# Patient Record
Sex: FEMALE | Race: WHITE | NOT HISPANIC OR LATINO | Employment: PART TIME | URBAN - METROPOLITAN AREA
[De-identification: names, ages, dates, MRNs, and addresses within clinical notes are randomized per-mention and may not be internally consistent; named-entity substitution may affect disease eponyms.]

---

## 2022-02-08 ENCOUNTER — HOSPITAL ENCOUNTER (EMERGENCY)
Facility: HOSPITAL | Age: 32
Discharge: HOME | End: 2022-02-08
Attending: EMERGENCY MEDICINE | Admitting: EMERGENCY MEDICINE
Payer: COMMERCIAL

## 2022-02-08 VITALS
HEIGHT: 63 IN | DIASTOLIC BLOOD PRESSURE: 85 MMHG | BODY MASS INDEX: 26.58 KG/M2 | HEART RATE: 82 BPM | WEIGHT: 150 LBS | SYSTOLIC BLOOD PRESSURE: 120 MMHG | OXYGEN SATURATION: 100 % | RESPIRATION RATE: 16 BRPM | TEMPERATURE: 98.2 F

## 2022-02-08 DIAGNOSIS — H60.502 ACUTE OTITIS EXTERNA OF LEFT EAR, UNSPECIFIED TYPE: ICD-10-CM

## 2022-02-08 PROCEDURE — 99283 EMERGENCY DEPT VISIT LOW MDM: CPT

## 2022-02-08 PROCEDURE — 250N000013 HC RX MED GY IP 250 OP 250 PS 637: Performed by: EMERGENCY MEDICINE

## 2022-02-08 PROCEDURE — 250N000011 HC RX IP 250 OP 636: Performed by: EMERGENCY MEDICINE

## 2022-02-08 RX ORDER — HYDROCODONE BITARTRATE AND ACETAMINOPHEN 5; 325 MG/1; MG/1
1 TABLET ORAL ONCE
Status: COMPLETED | OUTPATIENT
Start: 2022-02-08 | End: 2022-02-08

## 2022-02-08 RX ORDER — HYDROCODONE BITARTRATE AND ACETAMINOPHEN 5; 325 MG/1; MG/1
1-2 TABLET ORAL EVERY 4 HOURS PRN
Qty: 18 TABLET | Refills: 0 | Status: SHIPPED | OUTPATIENT
Start: 2022-02-08 | End: 2022-02-11

## 2022-02-08 RX ORDER — ONDANSETRON 4 MG/1
4 TABLET, ORALLY DISINTEGRATING ORAL ONCE
Status: COMPLETED | OUTPATIENT
Start: 2022-02-08 | End: 2022-02-08

## 2022-02-08 RX ADMIN — HYDROCODONE BITARTRATE AND ACETAMINOPHEN 1 TABLET: 5; 325 TABLET ORAL at 01:10

## 2022-02-08 RX ADMIN — AMOXICILLIN AND CLAVULANATE POTASSIUM 1 TABLET: 875; 125 TABLET, FILM COATED ORAL at 01:10

## 2022-02-08 RX ADMIN — ONDANSETRON 4 MG: 4 TABLET, ORALLY DISINTEGRATING ORAL at 01:10

## 2022-02-08 ASSESSMENT — MIFFLIN-ST. JEOR: SCORE: 1354.53

## 2022-02-08 NOTE — DISCHARGE INSTRUCTIONS
Continue eardrops as previously prescribed  Ibuprofen 600 mg every 6 hours as needed for pain  Return to the emergency department for worsening problems or concerns

## 2022-02-08 NOTE — ED PROVIDER NOTES
EMERGENCY DEPARTMENT ENCOUnter      NAME: Madeline Graham  AGE: 31 year old female  YOB: 1990  MRN: [id number]  EVALUATION DATE & TIME: No admission date for patient encounter.    PCP: No Ref-Primary, Physician    ED PROVIDER: Ana Thayer MD      Chief Complaint   Patient presents with     bilateral ear pain         FINAL IMPRESSION:  1. Acute otitis externa of left ear, unspecified type          ED COURSE & MEDICAL DECISION MAKING:      In summary, the patient is a 31-year-old female that presents to the emergency department for evaluation of left ear pain thought secondary to an otitis externa.  The patient has been using  eardrops without improvement.  We will place on oral antibiotics and treat symptomatically as an outpatient with close outpatient follow-up.    12:56 AM I met with the patient, obtained history, performed an initial exam, and discussed options and plan for diagnostics and treatment here in the ED. Augmentin 1 tablet p.o. was administered for initiation of antibiotic therapy.  Vicodin 1 tablet p.o. was administered for pain.  Zofran ODT 4 mg p.o. was administered for nausea.  1:01 AM Discussed plans for discharge.    At the conclusion of the encounter I discussed the results of all of the tests and the disposition. The questions were answered. The patient or family acknowledged understanding and was agreeable with the care plan.         MEDICATIONS GIVEN IN THE EMERGENCY:  Medications   amoxicillin-clavulanate (AUGMENTIN) 875-125 MG per tablet 1 tablet (1 tablet Oral Given 2/8/22 0110)   HYDROcodone-acetaminophen (NORCO) 5-325 MG per tablet 1 tablet (1 tablet Oral Given 2/8/22 0110)   ondansetron (ZOFRAN-ODT) ODT tab 4 mg (4 mg Oral Given 2/8/22 0110)       NEW PRESCRIPTIONS STARTED AT TODAY'S ER VISIT  Discharge Medication List as of 2/8/2022  1:16 AM      START taking these medications    Details   amoxicillin-clavulanate (AUGMENTIN) 875-125 MG tablet Take 1 tablet  by mouth 2 times daily for 7 days, Disp-14 tablet, R-0, Local Print      HYDROcodone-acetaminophen (NORCO) 5-325 MG tablet Take 1-2 tablets by mouth every 4 hours as needed for pain, Disp-18 tablet, R-0, Local Print                =================================================================    HPI        Madeline Graham is a 31 year old female with no recorded pertinent medical history who presents to this ED by walk in for evaluation of ear pain. Patient reports developing bilateral ear pain 1 week ago with associated slight ear drainage. Patient states was prescribed ear drops via a virtual visit 2 days ago, but states her ear pain continued to get progressively worse since then. She rates her current pain at 9.5/10 and states her left ear hurts more than her right. Patient took ibuprofen for pain control with minimal relief 1-2 hours prior to the patient encounter.    No additional pertinent medical history. No allergies to medications or daily prescription medication use.     Denies fever, chills, or any other symptoms at this time.    SHx- Endorses tobacco, alcohol use (occasional). 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      REVIEW OF SYSTEMS     Constitutional:  Denies fever or chills  HENT:  Positive for ear pain (bilateral; left greater than right), ear drainage (slight). Denies sore throat   Respiratory:  Denies cough or shortness of breath   Cardiovascular:  Denies chest pain or palpitations  GI:  Denies abdominal pain, nausea, or vomiting  Musculoskeletal:  Denies any new extremity pain   Skin:  Denies rash   Neurologic:  Denies headache, focal weakness or sensory changes    All other systems reviewed and are negative      PAST MEDICAL HISTORY:  No past medical history on file.    PAST SURGICAL HISTORY:  Past Surgical History:   Procedure Laterality Date     GYN SURGERY                 CURRENT MEDICATIONS:    amoxicillin-clavulanate (AUGMENTIN) 875-125 MG tablet  HYDROcodone-acetaminophen  "(NORCO) 5-325 MG tablet  ibuprofen (ADVIL,MOTRIN) 800 MG tablet  oxycodone-acetaminophen (PERCOCET) 5-325 MG per tablet  TRAMADOL HCL        ALLERGIES:  No Known Allergies      SOCIAL HISTORY:   Social History     Socioeconomic History     Marital status: Single     Spouse name: Not on file     Number of children: Not on file     Years of education: Not on file     Highest education level: Not on file   Occupational History     Not on file   Tobacco Use     Smoking status: Current Every Day Smoker     Packs/day: 1.00     Years: 5.00     Pack years: 5.00     Types: Cigarettes     Smokeless tobacco: Not on file   Substance and Sexual Activity     Alcohol use: Yes     Comment: ocassionally      Drug use: No     Sexual activity: Not Currently   Other Topics Concern     Not on file   Social History Narrative     Not on file     Social Determinants of Health     Financial Resource Strain: Not on file   Food Insecurity: Not on file   Transportation Needs: Not on file   Physical Activity: Not on file   Stress: Not on file   Social Connections: Not on file   Intimate Partner Violence: Not on file   Housing Stability: Not on file       VITALS:  Patient Vitals for the past 24 hrs:   BP Temp Temp src Pulse Resp SpO2 Height Weight   02/08/22 0021 120/85 98.2  F (36.8  C) Oral 82 16 100 % 1.6 m (5' 3\") 68 kg (150 lb)       PHYSICAL EXAM    Constitutional:  Well developed, Well nourished,  HENT:  Normocephalic, Atraumatic, left external auditory canal is edematous and fluid-filled, left ear auricle is mildly erythematous without displacement of auricle,  oropharynx moist, Nose normal.   Neck:  Normal range of motion, No meningismus, No stridor.   Eyes:  EOMI, Conjunctiva normal, No discharge.   Respiratory:  Normal breath sounds, No respiratory distress, No wheezing, No chest tenderness.   Cardiovascular:  Normal heart rate, Normal rhythm, No murmurs  Musculoskeletal:  Neurovascularly intact distally, No edema, No tenderness, No " cyanosis, Good range of motion in all major joints. No tenderness to palpation or major deformities noted.   Integument:  Warm, Dry, left ear auricle is mildly erythematous without edema  Lymphatic:  No lymphadenopathy noted.   No focal deficits noted.   Psychiatric:  Affect normal, Judgment normal, Mood normal.             I, Samir Morrow, am serving as a scribe to document services personally performed by Dr. Thayer based on my observation and the provider's statements to me. I, Ana Thayer MD attest that Samir Morrow is acting in a scribe capacity, has observed my performance of the services and has documented them in accordance with my direction.    Ana Thayer MD  Emergency Medicine  [facility] EMERGENCY DEPARTMENT  [address] [place] [id number]  [phone]  Dept: [phone]     Ana Thayer MD  02/08/22 0408

## 2022-02-08 NOTE — ED NOTES
Madeline has tolerated the medications well; she has been able to eat a couple of soda crackers and has been sipping on osmin mist. No signs of allergy/intolerance.

## 2022-02-08 NOTE — ED TRIAGE NOTES
"Bilateral ear pain for 1 week. Slight drainage. She tried \"antibiotic drops and things got worse\". Pain a 8.  "

## 2022-05-10 ENCOUNTER — APPOINTMENT (OUTPATIENT)
Dept: CT IMAGING | Facility: CLINIC | Age: 32
End: 2022-05-10
Attending: EMERGENCY MEDICINE
Payer: COMMERCIAL

## 2022-05-10 ENCOUNTER — HOSPITAL ENCOUNTER (INPATIENT)
Facility: CLINIC | Age: 32
LOS: 1 days | Discharge: HOME | End: 2022-05-12
Attending: EMERGENCY MEDICINE | Admitting: HOSPITALIST
Payer: COMMERCIAL

## 2022-05-10 DIAGNOSIS — H92.01 RIGHT EAR PAIN: ICD-10-CM

## 2022-05-10 DIAGNOSIS — H60.502 ACUTE OTITIS EXTERNA OF LEFT EAR, UNSPECIFIED TYPE: Primary | ICD-10-CM

## 2022-05-10 DIAGNOSIS — K11.21 PAROTITIS, ACUTE: ICD-10-CM

## 2022-05-10 LAB
ANION GAP SERPL CALCULATED.3IONS-SCNC: 10 MMOL/L (ref 5–18)
BUN SERPL-MCNC: 15 MG/DL (ref 8–22)
CALCIUM SERPL-MCNC: 9.1 MG/DL (ref 8.5–10.5)
CHLORIDE BLD-SCNC: 105 MMOL/L (ref 98–107)
CO2 SERPL-SCNC: 24 MMOL/L (ref 22–31)
CREAT SERPL-MCNC: 0.72 MG/DL (ref 0.6–1.1)
ERYTHROCYTE [DISTWIDTH] IN BLOOD BY AUTOMATED COUNT: 11.8 % (ref 10–15)
GFR SERPL CREATININE-BSD FRML MDRD: >90 ML/MIN/1.73M2
GLUCOSE BLD-MCNC: 46 MG/DL (ref 70–125)
GLUCOSE BLDC GLUCOMTR-MCNC: 101 MG/DL (ref 70–99)
GLUCOSE BLDC GLUCOMTR-MCNC: 104 MG/DL (ref 70–99)
HCG UR QL: NEGATIVE
HCT VFR BLD AUTO: 41.2 % (ref 35–47)
HGB BLD-MCNC: 13.7 G/DL (ref 11.7–15.7)
HOLD SPECIMEN: NORMAL
MCH RBC QN AUTO: 31.9 PG (ref 26.5–33)
MCHC RBC AUTO-ENTMCNC: 33.3 G/DL (ref 31.5–36.5)
MCV RBC AUTO: 96 FL (ref 78–100)
PLATELET # BLD AUTO: 300 10E3/UL (ref 150–450)
POTASSIUM BLD-SCNC: 4 MMOL/L (ref 3.5–5)
RBC # BLD AUTO: 4.3 10E6/UL (ref 3.8–5.2)
SODIUM SERPL-SCNC: 139 MMOL/L (ref 136–145)
WBC # BLD AUTO: 8.9 10E3/UL (ref 4–11)

## 2022-05-10 PROCEDURE — 99285 EMERGENCY DEPT VISIT HI MDM: CPT | Mod: 25

## 2022-05-10 PROCEDURE — 85027 COMPLETE CBC AUTOMATED: CPT | Performed by: EMERGENCY MEDICINE

## 2022-05-10 PROCEDURE — 36415 COLL VENOUS BLD VENIPUNCTURE: CPT | Performed by: EMERGENCY MEDICINE

## 2022-05-10 PROCEDURE — 70487 CT MAXILLOFACIAL W/DYE: CPT

## 2022-05-10 PROCEDURE — 96375 TX/PRO/DX INJ NEW DRUG ADDON: CPT

## 2022-05-10 PROCEDURE — 80048 BASIC METABOLIC PNL TOTAL CA: CPT | Performed by: EMERGENCY MEDICINE

## 2022-05-10 PROCEDURE — 81025 URINE PREGNANCY TEST: CPT | Performed by: EMERGENCY MEDICINE

## 2022-05-10 PROCEDURE — 96365 THER/PROPH/DIAG IV INF INIT: CPT

## 2022-05-10 PROCEDURE — 258N000003 HC RX IP 258 OP 636: Performed by: EMERGENCY MEDICINE

## 2022-05-10 PROCEDURE — 250N000011 HC RX IP 250 OP 636: Performed by: EMERGENCY MEDICINE

## 2022-05-10 PROCEDURE — 96366 THER/PROPH/DIAG IV INF ADDON: CPT

## 2022-05-10 PROCEDURE — C9803 HOPD COVID-19 SPEC COLLECT: HCPCS

## 2022-05-10 PROCEDURE — 96367 TX/PROPH/DG ADDL SEQ IV INF: CPT

## 2022-05-10 PROCEDURE — 96361 HYDRATE IV INFUSION ADD-ON: CPT

## 2022-05-10 RX ORDER — IOPAMIDOL 755 MG/ML
100 INJECTION, SOLUTION INTRAVASCULAR ONCE
Status: COMPLETED | OUTPATIENT
Start: 2022-05-11 | End: 2022-05-10

## 2022-05-10 RX ORDER — DEXTROSE MONOHYDRATE 25 G/50ML
50 INJECTION, SOLUTION INTRAVENOUS ONCE
Status: DISCONTINUED | OUTPATIENT
Start: 2022-05-10 | End: 2022-05-10

## 2022-05-10 RX ORDER — ONDANSETRON 2 MG/ML
4 INJECTION INTRAMUSCULAR; INTRAVENOUS ONCE
Status: COMPLETED | OUTPATIENT
Start: 2022-05-10 | End: 2022-05-10

## 2022-05-10 RX ORDER — MORPHINE SULFATE 4 MG/ML
4 INJECTION, SOLUTION INTRAMUSCULAR; INTRAVENOUS ONCE
Status: COMPLETED | OUTPATIENT
Start: 2022-05-10 | End: 2022-05-10

## 2022-05-10 RX ADMIN — ONDANSETRON 4 MG: 2 INJECTION INTRAMUSCULAR; INTRAVENOUS at 21:05

## 2022-05-10 RX ADMIN — IOPAMIDOL 100 ML: 755 INJECTION, SOLUTION INTRAVENOUS at 23:52

## 2022-05-10 RX ADMIN — MORPHINE SULFATE 4 MG: 4 INJECTION, SOLUTION INTRAMUSCULAR; INTRAVENOUS at 21:06

## 2022-05-10 RX ADMIN — SODIUM CHLORIDE 500 ML: 9 INJECTION, SOLUTION INTRAVENOUS at 21:06

## 2022-05-10 RX ADMIN — CEFEPIME HYDROCHLORIDE 2 G: 2 INJECTION, POWDER, FOR SOLUTION INTRAVENOUS at 21:16

## 2022-05-10 ASSESSMENT — ENCOUNTER SYMPTOMS
FACIAL SWELLING: 1
VOMITING: 0
ABDOMINAL PAIN: 0
COUGH: 0
DIARRHEA: 0
TROUBLE SWALLOWING: 0
HEADACHES: 0
SHORTNESS OF BREATH: 0
NAUSEA: 0

## 2022-05-11 PROBLEM — H92.01 RIGHT EAR PAIN: Status: ACTIVE | Noted: 2022-05-11

## 2022-05-11 LAB
MRSA DNA SPEC QL NAA+PROBE: POSITIVE
SA TARGET DNA: POSITIVE
SARS-COV-2 RNA RESP QL NAA+PROBE: NEGATIVE

## 2022-05-11 PROCEDURE — 250N000013 HC RX MED GY IP 250 OP 250 PS 637: Performed by: HOSPITALIST

## 2022-05-11 PROCEDURE — 87077 CULTURE AEROBIC IDENTIFY: CPT | Performed by: HOSPITALIST

## 2022-05-11 PROCEDURE — 87641 MR-STAPH DNA AMP PROBE: CPT | Performed by: HOSPITALIST

## 2022-05-11 PROCEDURE — U0005 INFEC AGEN DETEC AMPLI PROBE: HCPCS | Performed by: EMERGENCY MEDICINE

## 2022-05-11 PROCEDURE — 99207 PR NO BILLABLE SERVICE THIS VISIT: CPT | Performed by: INTERNAL MEDICINE

## 2022-05-11 PROCEDURE — 258N000003 HC RX IP 258 OP 636: Performed by: STUDENT IN AN ORGANIZED HEALTH CARE EDUCATION/TRAINING PROGRAM

## 2022-05-11 PROCEDURE — 250N000013 HC RX MED GY IP 250 OP 250 PS 637: Performed by: INTERNAL MEDICINE

## 2022-05-11 PROCEDURE — 250N000011 HC RX IP 250 OP 636: Performed by: STUDENT IN AN ORGANIZED HEALTH CARE EDUCATION/TRAINING PROGRAM

## 2022-05-11 PROCEDURE — 250N000011 HC RX IP 250 OP 636: Performed by: HOSPITALIST

## 2022-05-11 PROCEDURE — 99223 1ST HOSP IP/OBS HIGH 75: CPT | Performed by: HOSPITALIST

## 2022-05-11 PROCEDURE — 258N000003 HC RX IP 258 OP 636: Performed by: HOSPITALIST

## 2022-05-11 PROCEDURE — 120N000001 HC R&B MED SURG/OB

## 2022-05-11 RX ORDER — NALOXONE HYDROCHLORIDE 0.4 MG/ML
0.2 INJECTION, SOLUTION INTRAMUSCULAR; INTRAVENOUS; SUBCUTANEOUS
Status: DISCONTINUED | OUTPATIENT
Start: 2022-05-11 | End: 2022-05-12 | Stop reason: HOSPADM

## 2022-05-11 RX ORDER — HYDROMORPHONE HYDROCHLORIDE 1 MG/ML
0.3 INJECTION, SOLUTION INTRAMUSCULAR; INTRAVENOUS; SUBCUTANEOUS
Status: DISCONTINUED | OUTPATIENT
Start: 2022-05-11 | End: 2022-05-12 | Stop reason: HOSPADM

## 2022-05-11 RX ORDER — NICOTINE 21 MG/24HR
1 PATCH, TRANSDERMAL 24 HOURS TRANSDERMAL DAILY
Status: DISCONTINUED | OUTPATIENT
Start: 2022-05-11 | End: 2022-05-12 | Stop reason: HOSPADM

## 2022-05-11 RX ORDER — NALOXONE HYDROCHLORIDE 0.4 MG/ML
0.4 INJECTION, SOLUTION INTRAMUSCULAR; INTRAVENOUS; SUBCUTANEOUS
Status: DISCONTINUED | OUTPATIENT
Start: 2022-05-11 | End: 2022-05-12 | Stop reason: HOSPADM

## 2022-05-11 RX ORDER — TRAMADOL HYDROCHLORIDE 50 MG/1
50-100 TABLET ORAL EVERY 6 HOURS PRN
Status: DISCONTINUED | OUTPATIENT
Start: 2022-05-11 | End: 2022-05-12 | Stop reason: HOSPADM

## 2022-05-11 RX ORDER — IBUPROFEN 600 MG/1
600 TABLET, FILM COATED ORAL EVERY 6 HOURS PRN
Status: DISCONTINUED | OUTPATIENT
Start: 2022-05-11 | End: 2022-05-12 | Stop reason: HOSPADM

## 2022-05-11 RX ORDER — CIPROFLOXACIN AND DEXAMETHASONE 3; 1 MG/ML; MG/ML
4 SUSPENSION/ DROPS AURICULAR (OTIC) 2 TIMES DAILY
Status: DISCONTINUED | OUTPATIENT
Start: 2022-05-11 | End: 2022-05-12 | Stop reason: HOSPADM

## 2022-05-11 RX ORDER — KETOROLAC TROMETHAMINE 15 MG/ML
15 INJECTION, SOLUTION INTRAMUSCULAR; INTRAVENOUS EVERY 6 HOURS PRN
Status: DISCONTINUED | OUTPATIENT
Start: 2022-05-11 | End: 2022-05-11

## 2022-05-11 RX ORDER — ACETAMINOPHEN 325 MG/1
975 TABLET ORAL EVERY 8 HOURS
Status: DISCONTINUED | OUTPATIENT
Start: 2022-05-11 | End: 2022-05-12 | Stop reason: HOSPADM

## 2022-05-11 RX ORDER — LIDOCAINE 40 MG/G
CREAM TOPICAL
Status: DISCONTINUED | OUTPATIENT
Start: 2022-05-11 | End: 2022-05-12 | Stop reason: HOSPADM

## 2022-05-11 RX ORDER — TRAMADOL HYDROCHLORIDE 50 MG/1
50 TABLET ORAL EVERY 6 HOURS PRN
Status: DISCONTINUED | OUTPATIENT
Start: 2022-05-11 | End: 2022-05-11

## 2022-05-11 RX ADMIN — TRAMADOL HYDROCHLORIDE 50 MG: 50 TABLET, COATED ORAL at 23:58

## 2022-05-11 RX ADMIN — ACETAMINOPHEN 975 MG: 325 TABLET ORAL at 08:18

## 2022-05-11 RX ADMIN — NICOTINE POLACRILEX 4 MG: 4 GUM, CHEWING ORAL at 03:31

## 2022-05-11 RX ADMIN — CEFEPIME HYDROCHLORIDE 2 G: 2 INJECTION, POWDER, FOR SOLUTION INTRAVENOUS at 08:34

## 2022-05-11 RX ADMIN — CEFEPIME HYDROCHLORIDE 2 G: 2 INJECTION, POWDER, FOR SOLUTION INTRAVENOUS at 21:06

## 2022-05-11 RX ADMIN — NICOTINE 1 PATCH: 21 PATCH, EXTENDED RELEASE TRANSDERMAL at 03:43

## 2022-05-11 RX ADMIN — ACETAMINOPHEN 975 MG: 325 TABLET ORAL at 14:54

## 2022-05-11 RX ADMIN — CIPROFLOXACIN AND DEXAMETHASONE 4 DROP: 3; 1 SUSPENSION/ DROPS AURICULAR (OTIC) at 17:13

## 2022-05-11 RX ADMIN — VANCOMYCIN HYDROCHLORIDE 1000 MG: 5 INJECTION, POWDER, LYOPHILIZED, FOR SOLUTION INTRAVENOUS at 16:43

## 2022-05-11 RX ADMIN — VANCOMYCIN HYDROCHLORIDE 1250 MG: 5 INJECTION, POWDER, LYOPHILIZED, FOR SOLUTION INTRAVENOUS at 03:36

## 2022-05-11 RX ADMIN — ACETAMINOPHEN 975 MG: 325 TABLET ORAL at 21:06

## 2022-05-11 RX ADMIN — CIPROFLOXACIN AND DEXAMETHASONE 4 DROP: 3; 1 SUSPENSION/ DROPS AURICULAR (OTIC) at 21:06

## 2022-05-11 ASSESSMENT — ACTIVITIES OF DAILY LIVING (ADL)
DRESSING/BATHING_DIFFICULTY: NO
TOILETING_ISSUES: NO
ADLS_ACUITY_SCORE: 35
ADLS_ACUITY_SCORE: 18
ADLS_ACUITY_SCORE: 35
ADLS_ACUITY_SCORE: 35
DOING_ERRANDS_INDEPENDENTLY_DIFFICULTY: NO
ADLS_ACUITY_SCORE: 35
ADLS_ACUITY_SCORE: 35
FALL_HISTORY_WITHIN_LAST_SIX_MONTHS: NO
WEAR_GLASSES_OR_BLIND: NO
ADLS_ACUITY_SCORE: 18
CHANGE_IN_FUNCTIONAL_STATUS_SINCE_ONSET_OF_CURRENT_ILLNESS/INJURY: NO
ADLS_ACUITY_SCORE: 35
DIFFICULTY_EATING/SWALLOWING: NO
WALKING_OR_CLIMBING_STAIRS_DIFFICULTY: NO
CONCENTRATING,_REMEMBERING_OR_MAKING_DECISIONS_DIFFICULTY: NO
ADLS_ACUITY_SCORE: 18
ADLS_ACUITY_SCORE: 35
ADLS_ACUITY_SCORE: 18
ADLS_ACUITY_SCORE: 35

## 2022-05-11 NOTE — PHARMACY-ADMISSION MEDICATION HISTORY
Pharmacy Note - Admission Medication History    Pertinent Provider Information: None. ______________________________________________________________________    Prior To Admission (PTA) med list completed and updated in EMR.       No outpatient medications have been marked as taking for the 5/10/22 encounter (Hospital Encounter).     Information source(s): Patient, Clinic records and [facility]  Method of interview communication: in-person    Summary of Changes to PTA Med List  Patient reports she is not currently taking any prescription or OTC/herbal products specifically. PTA med list reflects this.    The information provided in this note is only as accurate as the sources available at the time of the update(s).    Thank you for the opportunity to participate in the care of this patient.    Lauren Mallory, PharmD, BCPS  5/10/2022 9:14 PM

## 2022-05-11 NOTE — PROGRESS NOTES
"[facility]    Hospitalist Progress Note    Date of Service (when I saw the patient): 05/11/2022    Assessment & Plan   Madeline Graham is a 32 year old female who was admitted on 5/10/2022.    Madeline Graham is a 32 year old female who  has no past medical history on file. tobacco abuse  Chief Complaint: right ear pain     Assessment and Plan  Right parotitis  Right otitis externa  Facial cellulitis  Continue cefepime, vancomycin started in the emergency room  Add Cipro-HC topical   MRSA nasal swab returned positive   Prn toradol  Schedule tylenol  ID consult requested      Hypoglycemia  No Hx DM, no prior episodes, no hypoglycemic medications  Asymptomatic blood glucose of 46 noted on BMP  Fingersticks showed fairly normal blood glucose  Possible laboratory error  Continue to monitor     Tobacco abuse  1ppd  Will order patch     DVTP: held, ambulate  Code Status: No Order Full  Disposition: Inpatient   Estimated body mass index is 26.57 kg/m  as calculated from the following:    Height as of this encounter: 1.6 m (5' 3\").    Weight as of this encounter: 68 kg (150 lb).     DVT Prophylaxis: Ambulate every shift  Code Status: Full Code    Disposition: Expected discharge in the next 2-3days after pain improved and antibiotic plan in place     Dian Ayers MD, MD  [phone] (P)      Interval History   Seen in Ed. Pain controlled. No acute issues since admission     -Data reviewed today: I reviewed all new labs and imaging results over the last 24 hours. I personally reviewed all the labs and imaging since admission     Physical Exam   Temp: 98.2  F (36.8  C) Temp src: Oral BP: 104/57 Pulse: 90   Resp: 16 SpO2: 100 % O2 Device: None (Room air)    Vitals:    05/10/22 1925   Weight: 68 kg (150 lb)     Vital Signs with Ranges  Temp:  [98  F (36.7  C)-98.2  F (36.8  C)] 98.2  F (36.8  C)  Pulse:  [73-94] 90  Resp:  [16-18] 16  BP: ()/(56-83) 104/57  SpO2:  [97 %-100 %] 100 %  I/O " last 3 completed shifts:  In: 250 [IV Piggyback:250]  Out: -     Constitutional: Awake, alert, cooperative, no apparent distress  Respiratory: Clear to auscultation bilaterally, no crackles or wheezing  Cardiovascular: Regular rate and rhythm, normal S1 and S2, and no murmur noted  GI: Normal bowel sounds, soft, non-distended, non-tender  Skin/Integumen: No rashes, no cyanosis, no edema. RIght ear with erythema and tender to palpation   Other:     Medications       acetaminophen  975 mg Oral Q8H     ceFEPIme (MAXIPIME) IV  2 g Intravenous Q12H     ciprofloxacin-hydrocortisone  3 drop Right Ear BID     nicotine  1 patch Transdermal Daily     nicotine   Transdermal Q8H     sodium chloride (PF)  3 mL Intracatheter Q8H     vancomycin  1,000 mg Intravenous Q12H       Data   Recent Labs   Lab 05/10/22  2220 05/10/22  2053 05/10/22  2026   WBC  --   --  8.9   HGB  --   --  13.7   MCV  --   --  96   PLT  --   --  300   NA  --   --  139   POTASSIUM  --   --  4.0   CHLORIDE  --   --  105   CO2  --   --  24   BUN  --   --  15   CR  --   --  0.72   ANIONGAP  --   --  10   YEN  --   --  9.1   * 101* 46*       Recent Results (from the past 24 hour(s))   CT Facial Bones with Contrast    Narrative    EXAM: CT FACIAL BONES WITH CONTRAST  LOCATION: [facility]  DATE/TIME: 5/10/2022 11:44 PM    INDICATION: Right ear swelling and infection.  COMPARISON: None.  CONTRAST: Isovue 370 75ml  TECHNIQUE: Routine CT Maxillofacial with IV contrast. Multiplanar reformats. Dose reduction techniques were used.     FINDINGS:  OSSEOUS STRUCTURES/SOFT TISSUES: There is asymmetric soft tissue swelling and enhancement involving the right ear with some involvement of the origin of the right proximal external auditory canal. Additionally, there is asymmetric enhancement and slight   asymmetric enlargement of the right parotid gland. No drainable fluid collection or abscess identified. No facial bone fracture or  malalignment. No evidence for dental trauma or periapical abscess.    ORBITAL CONTENTS: No acute abnormality.    SINUSES: Left frontal sinus mucous retention cyst. Subtotal opacification of the left ethmoid air cells. The remaining paranasal sinuses are predominantly clear. The bilateral mastoid air cells and middle ear cavities are clear.    VISUALIZED INTRACRANIAL CONTENTS: No acute abnormality.       Impression    IMPRESSION:   1.  Asymmetric soft tissue swelling and enhancement involving the right ear with some extension into the right proximal external auditory canal. These findings are concerning for right-sided otitis externa with associated soft tissue cellulitis of the   right ear.  2.  Asymmetric enhancement and slight enlargement of the right parotid gland, likely reflecting acute right-sided parotitis.  3.  No drainable fluid collection or abscess.  4.  The mastoid air cells are clear. No evidence of mastoiditis.

## 2022-05-11 NOTE — ED TRIAGE NOTES
"Pt arrives with complaints of right ear pain and swelling. Pt states, \"I have been having issues with it for the last few months, have been on antibiotics twice. Now it's swelling on the outside of my ear for the last day and a half.\" Last on antibiotics a month ago. Denies any hearing loss, did have blood on a Q-tip recently.      Triage Assessment     Row Name 05/10/22 1924       Triage Assessment (Adult)    Airway WDL WDL       Respiratory WDL    Respiratory WDL WDL       Skin Circulation/Temperature WDL    Skin Circulation/Temperature WDL WDL       Cardiac WDL    Cardiac WDL WDL       Peripheral/Neurovascular WDL    Peripheral Neurovascular WDL WDL       Cognitive/Neuro/Behavioral WDL    Cognitive/Neuro/Behavioral WDL WDL              "

## 2022-05-11 NOTE — UTILIZATION REVIEW
Admission Status; Secondary Review Determination   Under the authority of the Utilization Management Committee, the utilization review process indicated a secondary review on Madeline Graham. The review outcome is based on review of the medical records, discussions with staff, and applying clinical experience noted on the date of the review.   (x) Inpatient Status Appropriate - This patient's medical care is consistent with medical management for inpatient care and reasonable inpatient medical practice.     RATIONALE FOR DETERMINATION   32 year old female with past medical history of tobacco abuse who presented to ER right ear pain and admitted with Right parotitis, Right otitis externa and Facial cellulitis, failed out patient oral antibiotics , still on IV antibiotics with vanco and cefepime     At the time of admission with the information available to the attending physician more than 2 nights Hospital complex care was anticipated, based on patient risk of adverse outcome if treated as outpatient and complex care required. Inpatient admission is appropriate based on the Medicare guidelines.   The information on this document is developed by the utilization review team in order for the business office to ensure compliance. This only denotes the appropriateness of proper admission status and does not reflect the quality of care rendered.   The definitions of Inpatient Status and Observation Status used in making the determination above are those provided in the CMS Coverage Manual, Chapter 1 and Chapter 6, section 70.4.   Sincerely,   Alex Moffett MD  Utilization Review  Physician Advisor  [facility]

## 2022-05-11 NOTE — H&P
"Hospital Medicine Service History and Physical  [facility]: [facility]    Madeline Graham is a 32 year old female who  has no past medical history on file. tobacco abuse  Chief Complaint: right ear pain    Assessment and Plan  Right parotitis  Right otitis externa  Facial cellulitis  Continue cefepime, vancomycin started in the emergency room  Add Cipro-HC topical  Check MRSA swab  Prn toradol  Schedule tylenol    Hypoglycemia  No Hx DM, no prior episodes, no hypoglycemic medications  Asymptomatic blood glucose of 46 noted on BMP  Fingersticks showed fairly normal blood glucose  Possible laboratory error  Continue to monitor    Tobacco abuse  1ppd  Will order patch    DVTP: held, ambulate  Code Status: No Order Full  Disposition: Inpatient   Estimated body mass index is 26.57 kg/m  as calculated from the following:    Height as of this encounter: 1.6 m (5' 3\").    Weight as of this encounter: 68 kg (150 lb).    History of Present Illness  Madeline Graham was initially seen 2/8 in the ED for ear pain. She was Dx'ed with otitis externa and discharged on Augmentin. She took her antibiotics as prescribed, and did have an improvement in her Sx, but did not experience complete resolution. Her ear pain returned and so she was seen again at [facility] mid-march where they gave her amoxicillin and external cortisporin  She used these treatments as prescribed. She hasn't had change in hearing. No changes in vision. Denies Hx of prior ear infection. No mouth pain. Has Hx abscessed tooth right mandible. Interestingly she had several molars removed after a pregnancy complication.  ROS + ear pain  ROS - chest pain, sob, N/V/D, fevers/chills, dysuria  All other systems reviewed and are negative  In the ED, patient is afebrile, vital signs stable, stable on room air.  No leukocytosis on labs.  CT facial bones with contrast shows soft tissue swelling/enhancement right ear with some extension into proximal external " auditory canal concerning for otitis externa and soft tissue    Appears fatigued  Anicteric conjunctiva, PERRL  moist mucous membranes, poor dentition all molars surgically absent  No purulence from parotid duct, ttp right external ear and pre-auricular area, no drainage noted, tragus edematous and slightly erythematous, external canal narrowed  Trachea midline, no LAD  cta, Respiratory effort normal on RA  rrr, no murmur  Abdomen soft, nondistended, nontender  no edema, clubbing of nails absent  Skin normal temperature, dry  Fairly normal affect, alert  Vital signs reviewed by me    Wt Readings from Last 4 Encounters:   05/10/22 68 kg (150 lb)   02/08/22 68 kg (150 lb)   02/20/08 62.6 kg (138 lb) (66 %, Z= 0.42)*   01/18/07 59.9 kg (132 lb) (62 %, Z= 0.29)*     * Growth percentiles are based on [facility] (Girls, 2-20 Years) data.        reports that she has been smoking cigarettes. She has a 5.00 pack-year smoking history. She does not have any smokeless tobacco history on file. She reports current alcohol use. She reports that she does not use drugs.  family history is not on file.  Mother - frequent cysts   has a past surgical history that includes GYN surgery.   No Known Allergies    Reinier Butterfield MD, MPH  [facility]   Phone: #[phone]

## 2022-05-11 NOTE — PLAN OF CARE
Pt eating, drinking, voiding and has active bowel sounds.      She states her pain is tolerable and didn't need anything for it.    Problem: Plan of Care - These are the overarching goals to be used throughout the patient stay.    Goal: Plan of Care Review/Shift Note  Description: The Plan of Care Review/Shift note should be completed every shift.  The Outcome Evaluation is a brief statement about your assessment that the patient is improving, declining, or no change.  This information will be displayed automatically on your shift note.  Outcome: Ongoing, Progressing     Problem: Infection  Goal: Absence of Infection Signs and Symptoms  Outcome: Ongoing, Progressing     Continue to monitor VS, labs, pain level, ear infection and activity tolerance.       Goal Outcome Evaluation:

## 2022-05-11 NOTE — PHARMACY-VANCOMYCIN DOSING SERVICE
Pharmacy Vancomycin Initial Note  Date of Service May 11, 2022  Patient's  1990  32 year old, female    Indication: Skin and Soft Tissue Infection    Current estimated CrCl = Estimated Creatinine Clearance: 101.8 mL/min (based on SCr of 0.72 mg/dL).    Creatinine for last 3 days  5/10/2022:  8:26 PM Creatinine 0.72 mg/dL    Recent Vancomycin Level(s) for last 3 days  No results found for requested labs within last 72 hours.      Vancomycin IV Administrations (past 72 hours)                   vancomycin 1250 mg in 0.9% NaCl 250 mL intermittent infusion 1,250 mg (mg) 1,250 mg New Bag 22 0336                Nephrotoxins and other renal medications (From now, onward)    Start     Dose/Rate Route Frequency Ordered Stop    22 0330  vancomycin 1250 mg in 0.9% NaCl 250 mL intermittent infusion 1,250 mg         1,250 mg  over 90 Minutes Intravenous ONCE 22 0314            Contrast Orders - past 72 hours (72h ago, onward)    Start     Dose/Rate Route Frequency Stop    22 0000  iopamidol (ISOVUE-370) solution 100 mL         100 mL Intravenous ONCE 05/10/22 2352        Loading dose: 1250mg x 1 0330 22  Regimen: 1000 mg IV every 12 hours.  Start time: 15:36 on 2022  Exposure target: AUC24 (range)400-600 mg/L.hr   AUC24,ss: 446 mg/L.hr  Probability of AUC24 > 400: 61 %  Ctrough,ss: 13.0 mg/L  Probability of Ctrough,ss > 20: 19 %  Probability of nephrotoxicity (Lodise CATHLEEN ): 8 %            Plan:  1. Start vancomycin 1250mg x 1 followed by 1000mg q12h.  2. Vancomycin monitoring method: AUC  3. Vancomycin therapeutic monitoring goal: 400-600 mg*h/L  4. Pharmacy will check vancomycin levels as appropriate in 1-3 Days.    5. Serum creatinine levels will be ordered daily for the first week of therapy and at least twice weekly for subsequent weeks.      Yomi Kaiser [facility]

## 2022-05-11 NOTE — ED PROVIDER NOTES
In the    EMERGENCY DEPARTMENT ENCOUNTER      NAME: Madeline Graham  AGE: 32 year old female  YOB: 1990  MRN: [id number]  EVALUATION DATE & TIME: 5/10/2022  8:15 PM    PCP: No Ref-Primary, Physician    ED PROVIDER: Mackenzie Hodges M.D.        Chief Complaint   Patient presents with     Otalgia     Facial Swelling         FINAL IMPRESSION:    1. Right ear pain            MEDICAL DECISION MAKIN year old female who reports frequent ear infections over the past couple of months.  She has been taking antibiotics but as soon as the antibiotics complete the infection seems to come back.  She reports that she is currently on an amoxicillin antibiotic at this time and despite taking that she is now having increasing ear pain and swelling including pain into the bones anterior and posterior to the ear.  Concerns for mastoiditis.  Laboratories look good.  Since she is having chronic ear infections Pseudomonas was a concern and patient was treated empirically with cefepime awaiting CT scan results.  Ultimately patient will require hospitalization since this infection appears to be getting worse despite oral antibiotics even though mastoiditis is not seen by CT scan.  Patient is aware of this plan.    Patient signed out at change of shift awaiting admission to hospital.  Patient is aware that there may not be a bed available here at [facility] and that we will look to outside hospitals in the [facility] for bed placement          ED COURSE:  8:51 PM  I met with the patient to gather history and perform my exam. ED course and treatment discussed.    9:02 PM  See the call from lab that her sugar was in the 40s on her BMP.  Nursing rechecked and is now 100.  Patient states she had been eating some candy just a few moments ago.  We will recheck the sugar in 30 minutes.  She denies being diabetic or on any medicines for diabetes.    12:34 AM  I updated patient and her  that we are still waiting for CT  scan results.  No matter what the CT scan shows though she will require hospitalization as she is reportedly taking oral biotics without improvement and has been battling this now for several months.  She understands.  Margarita bustamante does plan to hang out though to figure out where she is going to be admitted to in case he needs to transfer her by private car.  This is certainly understandable and reasonable.  They are aware that this may take a few hours now to find an available bed once we have the CT scan result.  Patient has a ready been given antibiotics for concerns for possible mastoiditis to ongoing chronic otitis media.  Pseudomonas was certainly a concern in the situation he does not appear toxic or septic.  No concerns for meningitis.    12:46 AM  Patient signed out at change of shift to Dr. Bender looking for a hospital bed for admission.      COVID-19 PPE worn during patient evaluation:  Mask: n95 and homemade masks   Eye Protection: goggles   Gown: none   Hair cover: yes  Face shield: none   Patient wearing a mask: yes     At the conclusion of the encounter I discussed the results of all of the tests and the disposition. Their questions were answered. The patient (and any family present) acknowledged understanding and were agreeable with the care plan.      CONSULTANTS:  none      MEDICATIONS GIVEN IN THE EMERGENCY:  Medications   0.9% sodium chloride BOLUS (0 mLs Intravenous Stopped 5/10/22 2141)   morphine (PF) injection 4 mg (4 mg Intravenous Given 5/10/22 2106)   ondansetron (ZOFRAN) injection 4 mg (4 mg Intravenous Given 5/10/22 2105)   ceFEPIme (MAXIPIME) 2 g vial to attach to  ml bag for ADULTS or 50 ml bag for PEDS (0 g Intravenous Stopped 5/10/22 2147)   iopamidol (ISOVUE-370) solution 100 mL (100 mLs Intravenous Given 5/10/22 2352)       NEW PRESCRIPTIONS STARTED AT TODAY'S ER VISIT     Medication List      There are no discharge medications for this visit.              CONDITION:  stable        DISPOSITION:  Pending admission         =================================================================  =================================================================    HPI    Patient information was obtained from: patient    Use of Intrepreter: N/A     Madeline Graham is a 32 year old female with history of otitis media who presents to the ER with complaints of right ear pain.    States she has been having an ear infection in this right ear since about January.  Antibiotics will seem to work but then shortly after getting off them the infection will come right back.  Now about 2 days ago she started having swelling to the right ear and now pain the right side of her face.  She is currently on amoxicillin.    Denies any fevers, cough, chest pain, shortness of breath, vomiting, diarrhea.    She has not been seen by ENT for this.      REVIEW OF SYSTEMS  Review of Systems   HENT: Positive for ear pain and facial swelling. Negative for trouble swallowing.    Respiratory: Negative for cough and shortness of breath.    Cardiovascular: Negative for chest pain.   Gastrointestinal: Negative for abdominal pain, diarrhea, nausea and vomiting.   Allergic/Immunologic: Negative for immunocompromised state.   Neurological: Negative for headaches.   All other systems reviewed and are negative.          PAST MEDICAL HISTORY:  No past medical history on file.      PAST SURGICAL HISTORY:  Past Surgical History:   Procedure Laterality Date     GYN SURGERY               CURRENT MEDICATIONS:    Prior to Admission medications    Medication Sig Start Date End Date Taking? Authorizing Provider   ibuprofen (ADVIL,MOTRIN) 800 MG tablet Take 1 tablet by mouth every 8 hours as needed for pain and fever. 11   Adam Jimenez MD   oxycodone-acetaminophen (PERCOCET) 5-325 MG per tablet Take 1 tablet by mouth every 4 hours as needed for pain. 11   Adam Jimenez MD  "  TRAMADOL HCL     Reported, Patient         ALLERGIES:  No Known Allergies      FAMILY HISTORY:  No family history on file.      SOCIAL HISTORY:  Social History     Socioeconomic History     Marital status: Single   Tobacco Use     Smoking status: Current Every Day Smoker     Packs/day: 1.00     Years: 5.00     Pack years: 5.00     Types: Cigarettes   Substance and Sexual Activity     Alcohol use: Yes     Comment: ocassionally      Drug use: No     Sexual activity: Not Currently         VITALS:  Patient Vitals for the past 24 hrs:   BP Temp Temp src Pulse Resp SpO2 Height Weight   05/11/22 0000 106/58 -- -- 85 18 100 % -- --   05/10/22 1925 110/81 98  F (36.7  C) Oral 92 18 100 % 1.6 m (5' 3\") 68 kg (150 lb)       Wt Readings from Last 3 Encounters:   05/10/22 68 kg (150 lb)   02/08/22 68 kg (150 lb)   02/20/08 62.6 kg (138 lb) (66 %, Z= 0.42)*     * Growth percentiles are based on [facility] (Girls, 2-20 Years) data.         PHYSICAL EXAM    Constitutional:  Well developed, Well nourished, NAD, GCS 15  HENT:  Normocephalic, Atraumatic, left external ear normal.  Right external ear with swelling and tenderness both anteriorly and posterior to the ear.  Ear canal quite swollen though no obvious debris in the external canal. Nose normal. Neck- Supple, No stridor.  Eyes:  PERRL, EOMI, Conjunctiva normal, No discharge.  Respiratory:  Normal breath sounds, No respiratory distress, No wheezing, Speaks full sentences easily. No cough.   Cardiovascular:  Normal heart rate, Regular rhythm, No rubs, No gallops.   GI:  No excessive obesity.  Bowel sounds normal, Soft, No tenderness, No masses, No flank tenderness. No rebound or guarding.  : deferred  Musculoskeletal: No cyanosis, No clubbing. Good range of motion in all major joints. No major deformities noted.   Integument:  Warm, Dry, No erythema, No rash.  No petechiae.   Neurologic:  Alert & oriented x 3, No focal deficits noted. Normal gait.   Psychiatric:  Affect normal, " Cooperative         LAB:  All pertinent labs reviewed and interpreted.  Recent Results (from the past 24 hour(s))   CBC (+ platelets, no diff)    Collection Time: 05/10/22  8:26 PM   Result Value Ref Range    WBC Count 8.9 4.0 - 11.0 10e3/uL    RBC Count 4.30 3.80 - 5.20 10e6/uL    Hemoglobin 13.7 11.7 - 15.7 g/dL    Hematocrit 41.2 35.0 - 47.0 %    MCV 96 78 - 100 fL    MCH 31.9 26.5 - 33.0 pg    MCHC 33.3 31.5 - 36.5 g/dL    RDW 11.8 10.0 - 15.0 %    Platelet Count 300 150 - 450 10e3/uL   Basic metabolic panel    Collection Time: 05/10/22  8:26 PM   Result Value Ref Range    Sodium 139 136 - 145 mmol/L    Potassium 4.0 3.5 - 5.0 mmol/L    Chloride 105 98 - 107 mmol/L    Carbon Dioxide (CO2) 24 22 - 31 mmol/L    Anion Gap 10 5 - 18 mmol/L    Urea Nitrogen 15 8 - 22 mg/dL    Creatinine 0.72 0.60 - 1.10 mg/dL    Calcium 9.1 8.5 - 10.5 mg/dL    Glucose 46 (LL) 70 - 125 mg/dL    GFR Estimate >90 >60 mL/min/1.73m2   Extra Red Top Tube    Collection Time: 05/10/22  8:26 PM   Result Value Ref Range    Hold Specimen JIC    Extra Green Top (Lithium Heparin) Tube    Collection Time: 05/10/22  8:26 PM   Result Value Ref Range    Hold Specimen JIC    Extra Purple Top Tube    Collection Time: 05/10/22  8:26 PM   Result Value Ref Range    Hold Specimen JIC    Glucose by meter    Collection Time: 05/10/22  8:53 PM   Result Value Ref Range    GLUCOSE BY METER POCT 101 (H) 70 - 99 mg/dL   Glucose by meter    Collection Time: 05/10/22 10:20 PM   Result Value Ref Range    GLUCOSE BY METER POCT 104 (H) 70 - 99 mg/dL   HCG qualitative urine    Collection Time: 05/10/22 10:39 PM   Result Value Ref Range    hCG Urine Qualitative Negative Negative       No results found for: ABORH        RADIOLOGY:  Reviewed all pertinent imaging. Please see official radiology report.    CT Facial Bones with Contrast   Preliminary Result   IMPRESSION:    1.  Asymmetric soft tissue swelling and enhancement involving the right ear with some extension into  the right external auditory canal. These findings are concerning for right-sided otitis externa with associated soft tissue cellulitis of the right ear.   2.  Asymmetric enhancement and slight enlargement of the right parotid gland, likely reflecting acute right-sided parotitis.   3.  No drainable fluid collection or abscess.   4.  The mastoid air cells are clear.            EKG:    none    PROCEDURES:  none        Mackenzie Hodges M.D. [facility]  Emergency Medicine and Medical Toxicology  [facility] EMERGENCY ROOM  [address] [id number] [phone]  Dept: [phone]           Mackenzie Hodges MD  05/11/22 0046

## 2022-05-11 NOTE — ED NOTES
"Received patient on sign out.    HPI  Madeline Graham is a 32 year old female with history of otitis media who presents to the ER with complaints of right ear pain.     States she has been having an ear infection in this right ear since about January.  Antibiotics will seem to work but then shortly after getting off them the infection will come right back.  Now about 2 days ago she started having swelling to the right ear and now pain the right side of her face.  She is currently on amoxicillin.     Denies any fevers, cough, chest pain, shortness of breath, vomiting, diarrhea.     She has not been seen by ENT for this.           Vitals:    05/10/22 1925 05/11/22 0000   BP: 110/81 106/58   Pulse: 92 85   Resp: 18 18   Temp: 98  F (36.7  C)    TempSrc: Oral    SpO2: 100% 100%   Weight: 68 kg (150 lb)    Height: 1.6 m (5' 3\")               ED COURSE AND MEDICAL DECISION MAKING  12:51 AM the patient was signed out to me by Dr. Hodges        Received patient on signout.  Briefly 32-year-old who has been struggling with recurrent otitis media since January, here now with worsening symptoms.  Severe ear pain, drainage, redness and swelling on her face.  CT scan showing otitis media, otitis externa, as well as parotitis.  No mastoiditis.    On signout, awaiting bed placement.    [facility]  We will keep patient here.  Admitted to the hospitalist service.  Added on vancomycin to cover for MRSA.          FINAL DIAGNOSIS    Auricular cellulitis        Dr. Juvencio Bender  [facility] ER  May 11, 2022     Juvencio Bender DO  05/11/22 0312    "

## 2022-05-11 NOTE — PLAN OF CARE
Problem: Plan of Care - These are the overarching goals to be used throughout the patient stay.    Goal: Plan of Care Review/Shift Note  Description: The Plan of Care Review/Shift note should be completed every shift.  The Outcome Evaluation is a brief statement about your assessment that the patient is improving, declining, or no change.  This information will be displayed automatically on your shift note.  Outcome: Ongoing, Progressing     Problem: Plan of Care - These are the overarching goals to be used throughout the patient stay.    Goal: Optimal Comfort and Wellbeing  Outcome: Ongoing, Progressing   Goal Outcome Evaluation:        Patient transferred to room 376. Assessed and oriented to room and unit routine.   Stated that pain in 6/10. Resting comfortably.

## 2022-05-12 VITALS
HEART RATE: 83 BPM | WEIGHT: 150 LBS | DIASTOLIC BLOOD PRESSURE: 73 MMHG | OXYGEN SATURATION: 100 % | HEIGHT: 63 IN | BODY MASS INDEX: 26.58 KG/M2 | SYSTOLIC BLOOD PRESSURE: 122 MMHG | RESPIRATION RATE: 18 BRPM | TEMPERATURE: 97.4 F

## 2022-05-12 LAB
ANION GAP SERPL CALCULATED.3IONS-SCNC: 8 MMOL/L (ref 5–18)
BUN SERPL-MCNC: 11 MG/DL (ref 8–22)
CALCIUM SERPL-MCNC: 8.6 MG/DL (ref 8.5–10.5)
CHLORIDE BLD-SCNC: 108 MMOL/L (ref 98–107)
CO2 SERPL-SCNC: 22 MMOL/L (ref 22–31)
CREAT SERPL-MCNC: 0.58 MG/DL (ref 0.6–1.1)
ERYTHROCYTE [DISTWIDTH] IN BLOOD BY AUTOMATED COUNT: 11.6 % (ref 10–15)
GFR SERPL CREATININE-BSD FRML MDRD: >90 ML/MIN/1.73M2
GLUCOSE BLD-MCNC: 87 MG/DL (ref 70–125)
HCT VFR BLD AUTO: 41.8 % (ref 35–47)
HGB BLD-MCNC: 13.6 G/DL (ref 11.7–15.7)
MCH RBC QN AUTO: 31.3 PG (ref 26.5–33)
MCHC RBC AUTO-ENTMCNC: 32.5 G/DL (ref 31.5–36.5)
MCV RBC AUTO: 96 FL (ref 78–100)
PLATELET # BLD AUTO: 271 10E3/UL (ref 150–450)
POTASSIUM BLD-SCNC: 4.2 MMOL/L (ref 3.5–5)
RBC # BLD AUTO: 4.34 10E6/UL (ref 3.8–5.2)
SODIUM SERPL-SCNC: 138 MMOL/L (ref 136–145)
WBC # BLD AUTO: 10 10E3/UL (ref 4–11)

## 2022-05-12 PROCEDURE — 82310 ASSAY OF CALCIUM: CPT | Performed by: INTERNAL MEDICINE

## 2022-05-12 PROCEDURE — 99254 IP/OBS CNSLTJ NEW/EST MOD 60: CPT | Performed by: INTERNAL MEDICINE

## 2022-05-12 PROCEDURE — 85014 HEMATOCRIT: CPT | Performed by: INTERNAL MEDICINE

## 2022-05-12 PROCEDURE — 250N000011 HC RX IP 250 OP 636: Performed by: HOSPITALIST

## 2022-05-12 PROCEDURE — 99238 HOSP IP/OBS DSCHRG MGMT 30/<: CPT | Performed by: INTERNAL MEDICINE

## 2022-05-12 PROCEDURE — 36415 COLL VENOUS BLD VENIPUNCTURE: CPT | Performed by: INTERNAL MEDICINE

## 2022-05-12 PROCEDURE — 250N000013 HC RX MED GY IP 250 OP 250 PS 637: Performed by: HOSPITALIST

## 2022-05-12 PROCEDURE — 258N000003 HC RX IP 258 OP 636: Performed by: HOSPITALIST

## 2022-05-12 RX ORDER — ACETAMINOPHEN 325 MG/1
975 TABLET ORAL EVERY 8 HOURS
Qty: 200 TABLET | Refills: 0 | COMMUNITY
Start: 2022-05-12 | End: 2022-05-12

## 2022-05-12 RX ORDER — IBUPROFEN 600 MG/1
600 TABLET, FILM COATED ORAL EVERY 8 HOURS PRN
Qty: 100 TABLET | Refills: 0 | Status: SHIPPED | OUTPATIENT
Start: 2022-05-12

## 2022-05-12 RX ORDER — OFLOXACIN 3 MG/ML
4 SOLUTION AURICULAR (OTIC) 2 TIMES DAILY
Qty: 10 ML | Refills: 0 | Status: ON HOLD | OUTPATIENT
Start: 2022-05-12 | End: 2024-03-20

## 2022-05-12 RX ORDER — OFLOXACIN 3 MG/ML
4 SOLUTION AURICULAR (OTIC) 2 TIMES DAILY
Qty: 10 ML | Refills: 0 | Status: SHIPPED | OUTPATIENT
Start: 2022-05-12 | End: 2022-05-12

## 2022-05-12 RX ORDER — CIPROFLOXACIN 500 MG/1
500 TABLET, FILM COATED ORAL 2 TIMES DAILY
Qty: 14 TABLET | Refills: 0 | Status: SHIPPED | OUTPATIENT
Start: 2022-05-12 | End: 2022-05-12

## 2022-05-12 RX ORDER — DOXYCYCLINE HYCLATE 100 MG
100 TABLET ORAL 2 TIMES DAILY
Qty: 20 TABLET | Refills: 0 | Status: ON HOLD | OUTPATIENT
Start: 2022-05-12 | End: 2024-03-20

## 2022-05-12 RX ORDER — ACETAMINOPHEN 325 MG/1
975 TABLET ORAL EVERY 8 HOURS PRN
Qty: 200 TABLET | Refills: 0 | Status: SHIPPED | OUTPATIENT
Start: 2022-05-12

## 2022-05-12 RX ORDER — DOXYCYCLINE HYCLATE 100 MG
100 TABLET ORAL 2 TIMES DAILY
Qty: 20 TABLET | Refills: 0 | Status: SHIPPED | OUTPATIENT
Start: 2022-05-12 | End: 2022-05-12

## 2022-05-12 RX ORDER — IBUPROFEN 600 MG/1
600 TABLET, FILM COATED ORAL EVERY 8 HOURS PRN
Qty: 100 TABLET | Refills: 0 | Status: SHIPPED | OUTPATIENT
Start: 2022-05-12 | End: 2022-05-12

## 2022-05-12 RX ORDER — CIPROFLOXACIN 500 MG/1
500 TABLET, FILM COATED ORAL 2 TIMES DAILY
Qty: 14 TABLET | Refills: 0 | Status: ON HOLD | OUTPATIENT
Start: 2022-05-12 | End: 2024-03-20

## 2022-05-12 RX ADMIN — CIPROFLOXACIN AND DEXAMETHASONE 4 DROP: 3; 1 SUSPENSION/ DROPS AURICULAR (OTIC) at 08:18

## 2022-05-12 RX ADMIN — ACETAMINOPHEN 975 MG: 325 TABLET ORAL at 05:45

## 2022-05-12 RX ADMIN — CEFEPIME HYDROCHLORIDE 2 G: 2 INJECTION, POWDER, FOR SOLUTION INTRAVENOUS at 08:18

## 2022-05-12 RX ADMIN — VANCOMYCIN HYDROCHLORIDE 1000 MG: 5 INJECTION, POWDER, LYOPHILIZED, FOR SOLUTION INTRAVENOUS at 04:22

## 2022-05-12 RX ADMIN — NICOTINE 1 PATCH: 21 PATCH, EXTENDED RELEASE TRANSDERMAL at 08:18

## 2022-05-12 ASSESSMENT — ACTIVITIES OF DAILY LIVING (ADL)
ADLS_ACUITY_SCORE: 18

## 2022-05-12 NOTE — PROGRESS NOTES
"A&Ox4. R ear inner/outer and right side of face warm, pink and swollen, tender to palpation. Given PRN tramadol x 1 and sched tylenol for pain with good relief. IV abx infused per orders. PIV SL. Up ind, calls appropriately. Hourly rounding completed, continuing to monitor.    Blood pressure 100/62, pulse 88, temperature 97.8  F (36.6  C), temperature source Oral, resp. rate 16, height 1.6 m (5' 3\"), weight 68 kg (150 lb), SpO2 97 %.    Mally Bahena RN  "

## 2022-05-12 NOTE — DISCHARGE SUMMARY
Pt is calling to get an update on the order for the genetic testing.  Pt would also like to clarify whether or not she was supposed to have other labs done besides the thyroid.    Call connected to [facility] OB Triage Queue.  Routed to provider's clinical pool.    Message confirmed with caller     [facility]  Discharge Summary        Madeline Graham MRN# [id number]   YOB: 1990 Age: 32 year old     Date of Admission:  5/10/2022  Date of Discharge:  5/12/2022  Admitting Physician:  No admitting provider for patient encounter.  Discharge Physician: Dian Ayers MD  Discharging Service: Hospitalist     Primary Provider: No Ref-Primary, Physician  Primary Care Physician Phone Number: None         Discharge Diagnoses/Problem Oriented Hospital Course (Providers):    Madeline Graham was admitted on 5/10/2022 by No admitting provider for patient encounter. and I would refer you to their history and physical.  The following problems were addressed during her hospitalization:  Madeline Garham is a 32 year old female who was admitted on 5/10/2022.     Madeline Graham is a 32 year old female who  has no past medical history on file. tobacco abuse  Chief Complaint: right ear pain     Assessment and Plan  Right parotitis  Right otitis externa  Facial cellulitis  Continue cefepime, vancomycin started in the emergency room  Add Cipro-HC topical   MRSA nasal swab returned positive   Prn toradol  Schedule tylenol, oral tramadol given for pain   ID consult requested     Patient will be discharged home on oral doxycycline to cover for possible MRSA and oral Cipro twice daily for 7 days  Ofloxacin otic eardrops also given to patient    Redness of the year in the parotid gland and tenderness and swelling much improved with treatment since admission  Patient feels much better today and she wants to go home and that she is getting discharged in stable condition  Her pain is well controlled with the Tylenol  Patient is to follow-up with outpatient ENT as soon as possible     Hypoglycemia  No Hx DM, no prior episodes, no hypoglycemic medications  Asymptomatic blood glucose of 46 noted on BMP  Fingersticks showed fairly normal blood glucose  Possible laboratory error  No further episodes of  "hypoglycemia noted     Tobacco abuse  1ppd  Will order patch     DVTP: held, ambulate  Code Status: No Order Full  Disposition: Inpatient   Estimated body mass index is 26.57 kg/m  as calculated from the following:    Height as of this encounter: 1.6 m (5' 3\").    Weight as of this encounter: 68 kg (150 lb).     DVT Prophylaxis: Ambulate every shift  Code Status: Full Code     Disposition:  Home today in stable condition     Dian Ayers MD, MD  [phone] (P)             Code Status:      Full Code        Brief Hospital Stay Summary Sent Home With Patient in AVS:        Reason for your hospital stay      Acute otitis externa and acute right parotitis                 Important Results:      See below         Pending Results:        Unresulted Labs Ordered in the Past 30 Days of this Admission     Date and Time Order Name Status Description    5/11/2022 10:15 AM MRSA Culture Reflex Preliminary             Discharge Instructions and Follow-Up:      Follow-up Appointments     Follow-up and recommended labs and tests       F/u with ENT in 1week               Discharge Disposition:      Discharged to home        Discharge Medications:        Current Discharge Medication List      START taking these medications    Details   acetaminophen (TYLENOL) 325 MG tablet Take 3 tablets (975 mg) by mouth every 8 hours  Qty: 200 tablet, Refills: 0    Associated Diagnoses: Acute otitis externa of left ear, unspecified type      ciprofloxacin (CIPRO) 500 MG tablet Take 1 tablet (500 mg) by mouth 2 times daily for 7 days  Qty: 14 tablet, Refills: 0    Associated Diagnoses: Acute otitis externa of left ear, unspecified type      doxycycline hyclate (VIBRA-TABS) 100 MG tablet Take 1 tablet (100 mg) by mouth 2 times daily for 10 days  Qty: 20 tablet, Refills: 0    Associated Diagnoses: Acute otitis externa of left ear, unspecified type      ibuprofen (ADVIL/MOTRIN) 600 MG tablet Take 1 tablet (600 mg) by mouth every 8 hours as needed " "for moderate pain  Qty: 100 tablet, Refills: 0    Associated Diagnoses: Acute otitis externa of left ear, unspecified type      ofloxacin (FLOXIN) 0.3 % otic solution Place 4 drops into the right ear 2 times daily  Qty: 10 mL, Refills: 0    Associated Diagnoses: Acute otitis externa of left ear, unspecified type               Allergies:       No Known Allergies        Consultations This Hospital Stay:      Consultation during this admission received from infectious disease        Condition and Physical on Discharge:      Discharge condition: Stable   Vitals: Blood pressure 122/73, pulse 83, temperature 97.4  F (36.3  C), temperature source Oral, resp. rate 18, height 1.6 m (5' 3\"), weight 68 kg (150 lb), SpO2 100 %.     Constitutional: Alert, awake, NAD    Lungs: CTA b/l    Cardiovascular: RRR   Abdomen: Soft, NT, ND, BS+   Skin: RIght sided ear and virgilio aricular area swelling and erythema much improved    Other:          Discharge Time:      Less than 30 minutes.        Image Results From This Hospital Stay (For Non-EPIC Providers):        Results for orders placed or performed during the hospital encounter of 05/10/22   CT Facial Bones with Contrast    Narrative    EXAM: CT FACIAL BONES WITH CONTRAST  LOCATION: [facility]  DATE/TIME: 5/10/2022 11:44 PM    INDICATION: Right ear swelling and infection.  COMPARISON: None.  CONTRAST: Isovue 370 75ml  TECHNIQUE: Routine CT Maxillofacial with IV contrast. Multiplanar reformats. Dose reduction techniques were used.     FINDINGS:  OSSEOUS STRUCTURES/SOFT TISSUES: There is asymmetric soft tissue swelling and enhancement involving the right ear with some involvement of the origin of the right proximal external auditory canal. Additionally, there is asymmetric enhancement and slight   asymmetric enlargement of the right parotid gland. No drainable fluid collection or abscess identified. No facial bone fracture or malalignment. No evidence for dental " trauma or periapical abscess.    ORBITAL CONTENTS: No acute abnormality.    SINUSES: Left frontal sinus mucous retention cyst. Subtotal opacification of the left ethmoid air cells. The remaining paranasal sinuses are predominantly clear. The bilateral mastoid air cells and middle ear cavities are clear.    VISUALIZED INTRACRANIAL CONTENTS: No acute abnormality.       Impression    IMPRESSION:   1.  Asymmetric soft tissue swelling and enhancement involving the right ear with some extension into the right proximal external auditory canal. These findings are concerning for right-sided otitis externa with associated soft tissue cellulitis of the   right ear.  2.  Asymmetric enhancement and slight enlargement of the right parotid gland, likely reflecting acute right-sided parotitis.  3.  No drainable fluid collection or abscess.  4.  The mastoid air cells are clear. No evidence of mastoiditis.           Most Recent Lab Results In EPIC (For Non-EPIC Providers):    Most Recent 3 CBC's:  Recent Labs   Lab Test 05/12/22  0709 05/10/22  2026   WBC 10.0 8.9   HGB 13.6 13.7   MCV 96 96    300      Most Recent 3 BMP's:  Recent Labs   Lab Test 05/12/22  0709 05/10/22  2220 05/10/22  2053 05/10/22  2026     --   --  139   POTASSIUM 4.2  --   --  4.0   CHLORIDE 108*  --   --  105   CO2 22  --   --  24   BUN 11  --   --  15   CR 0.58*  --   --  0.72   ANIONGAP 8  --   --  10   YEN 8.6  --   --  9.1   GLC 87 104* 101* 46*     Most Recent 3 Troponin's:No lab results found.    Invalid input(s): TROP, TROPONINIES  Most Recent 3 INR's:No lab results found.  Most Recent 2 LFT's:No lab results found.  Most Recent Cholesterol Panel:No lab results found.  Most Recent 6 Bacteria Isolates From Any Culture (See EPIC Reports for Culture Details):No lab results found.  Most Recent TSH, T4 and HgbA1c: No lab results found.

## 2022-05-12 NOTE — PROGRESS NOTES
Discharged to home. Stated understanding of discharge instructions. Patient requested prescriptions to be sent to a [facility]. She left prior to this writer letting her know  where the prescriptions will be filled.

## 2022-05-12 NOTE — PROGRESS NOTES
To whom it may concern,    Ms.Sara Graham was hospitalized from 5/11/22 to 5/13/22 at [facility]. Please excuse her from work until 5/16/22    Dian Ayers MD   hospitalist   [facility]   Phone [phone]

## 2022-05-12 NOTE — CONSULTS
"[facility]  General ID Service Consult      Patient: Madeline Graham  YOB: 1990, MRN: [id number]  Date of Admission:  5/10/2022  Date of Consult: 05/12/2022  Consult Requested by: Dian Ayers MD  Admission Diagnosis: Right ear pain [H92.01]      ID Assessment & Plan   Complicated otitis externa with cellulitis  outpt Augmentin  MRSA positive    PLAN  Improved on vanco cefepime  Ok wih po doxy and cipro x 10 days total abx  Plus otic cipro  followup ENT PRN    Will sign off, please call with questions        Kimberly Krishnan MD  [facility]  ______________________________________________________________________      History of Present Illness   Per dr Butterfield  Madeline Graham was initially seen 2/8 in the ED for ear pain. She was Dx'ed with otitis externa and discharged on Augmentin. She took her antibiotics as prescribed, and did have an improvement in her Sx, but did not experience complete resolution. Her ear pain returned and so she was seen again at [facility] mid-march where they gave her amoxicillin and external cortisporin  She used these treatments as prescribed. She hasn't had change in hearing. No changes in vision. Denies Hx of prior ear infection. No mouth pain. Has Hx abscessed tooth right mandible. Interestingly she had several molars removed after a pregnancy complication.  ROS + ear pain  ROS - chest pain, sob, N/V/D, fevers/chills, dysuria  All other systems reviewed and are negative  In the ED, patient is afebrile, vital signs stable, stable on room air.  No leukocytosis on labs.  CT facial bones with contrast shows soft tissue swelling/enhancement right ear with some extension into proximal external auditory canal concerning for otitis externa and soft tissue\"  EXAM in ed:  \" Right external ear with swelling and tenderness both anteriorly and posterior to the ear.  Ear canal quite swollen though no obvious debris in the external " "canal. Nose normal. Neck- Supple, No stridor.\"  Pt much improved now, never had ear drainage  Swelling gone  No prior MRSA, but SO has had MRSA  No swimming  No fevers pta    Radiology personally reviewed  Ct                                                                   IMPRESSION:   1.  Asymmetric soft tissue swelling and enhancement involving the right ear with some extension into the right proximal external auditory canal. These findings are concerning for right-sided otitis externa with associated soft tissue cellulitis of the   right ear.  2.  Asymmetric enhancement and slight enlargement of the right parotid gland, likely reflecting acute right-sided parotitis.  3.  No drainable fluid collection or abscess.  4.  The mastoid air cells are clear. No evidence of mastoiditis.    Review of Systems   The 10 point Review of Systems is negative other than noted in the HPI or here.     Past Medical History    No past medical history on file.    Past Surgical History   Past Surgical History:   Procedure Laterality Date     GYN SURGERY             Social History   Social History     Tobacco Use     Smoking status: Current Every Day Smoker     Packs/day: 1.00     Years: 5.00     Pack years: 5.00     Types: Cigarettes   Substance Use Topics     Alcohol use: Yes     Comment: ocassionally      Drug use: No       Family History     Pos diabetes in grandmother    Medications   I have reviewed this patient's current medications    Allergies   No Known Allergies    Physical Exam   Vital Signs: Temp: 97.4  F (36.3  C) Temp src: Oral BP: 122/73 Pulse: 83   Resp: 18 SpO2: 100 % O2 Device: None (Room air)    Weight: 150 lbs 0 oz    Gen. appearance nontoxic  Eyes no conjunctivitis or icterus  Head no swelling no tenderness, except tragus;no redness  Neck no stiffness or neck vein distention, no LN  Heart  No edema  Lungs breathing comfortably    Abdomen soft not tender  Extremities no synovitis, trace edema  Skin  no " rash or emboli  Neurologic alert oriented no focal deficits        Data   Inflammatory Markers No lab results found.     Hematology Studies   Recent Labs   Lab Test 05/12/22  0709 05/10/22  2026   WBC 10.0 8.9   HGB 13.6 13.7   MCV 96 96    300       Metabolic Studies   Recent Labs   Lab Test 05/12/22  0709 05/10/22  2026    139   POTASSIUM 4.2 4.0   CHLORIDE 108* 105   CO2 22 24   BUN 11 15   CR 0.58* 0.72   GFRESTIMATED >90 >90       Hepatic Studies  No lab results found.    Most Recent 6 Bacteria Isolates From Any Culture (See EPIC Reports for Culture Details):No lab results found.    Urine Studies  No lab results found.    Vancomycin Levels  No lab results found.    Invalid input(s): VANCO    Hepatitis B Testing No lab results found.  Hepatitis C Testing   No results found for: HCVAB, HQTG, HCGENO, HCPCR, HQTRNA, HEPRNA  HIVTesting No lab results found.    Respiratory Virus Testing    No results found for: RS, FLUAG  COVID-19 Antibody Results, Testing for Immunity    COVID-19 Antibody Results, Testing for Immunity   No data to display.         COVID-19 PCR Results    COVID-19 PCR Results 5/11/22   SARS CoV2 PCR Negative      Comments are available for some flowsheets but are not being displayed.

## 2022-05-12 NOTE — PROGRESS NOTES
Care Management Initial Consult      Additional Information:  Chart reviewed.  Pt from home independently.  CM following care progression to assist as needed with discharge planning.     ESTER Cruz

## 2022-05-13 ENCOUNTER — PATIENT OUTREACH (OUTPATIENT)
Dept: CARE COORDINATION | Facility: CLINIC | Age: 32
End: 2022-05-13
Payer: COMMERCIAL

## 2022-05-13 DIAGNOSIS — Z71.89 OTHER SPECIFIED COUNSELING: ICD-10-CM

## 2022-05-13 NOTE — PROGRESS NOTES
Clinic Care Coordination Contact  [facility]/Voicemail       Clinical Data: Care Coordinator Outreach  Reason for referral: TCM outreach  Outreach attempted x 1.  Left message on patient's voicemail with call back information and requested return call.  Plan:  Care Coordinator will try to reach patient again in 1-2 business days.       Ann Schultz  Community Health Worker  [facility] [place]  Ph: [phone]

## 2022-05-14 LAB — BACTERIA SPEC CULT: ABNORMAL

## 2022-05-14 NOTE — PROGRESS NOTES
Clinic Care Coordination Contact  [facility]/Voicemail       Clinical Data: Care Coordinator Outreach  Outreach attempted x 2.  Left message on patient's voicemail with call back information and requested return call.  Plan:Care Coordinator will try to reach patient again in 1-2 business days.    Melissa Smallwood, [facility]  [phone]  [facility]

## 2022-05-19 ENCOUNTER — OFFICE VISIT (OUTPATIENT)
Dept: OTOLARYNGOLOGY | Facility: CLINIC | Age: 32
End: 2022-05-19
Payer: COMMERCIAL

## 2022-05-19 DIAGNOSIS — H60.331 ACUTE SWIMMER'S EAR OF RIGHT SIDE: Primary | ICD-10-CM

## 2022-05-19 PROCEDURE — 99203 OFFICE O/P NEW LOW 30 MIN: CPT | Performed by: OTOLARYNGOLOGY

## 2022-05-19 NOTE — PROGRESS NOTES
HPI: This patient is a 31yo F who presents for follow-up for her ear at the request of Dr. Ayers. She has been seen a few times over the past 2 months with a draining and painful right ear. She was treated with cortisporin and oral antibiotics via urgent cares, etc. But the issue kept recurring. She presented again 2 weeks ago to the ER and was admitted for 2 days and placed on IV antibiotics. She was diagnosed with OE and parotitis, but by her account of her symptoms, it seems that she really just had a rough and undertreated AOE. Since discharge, and being in ciprodex and oral cipro, she is feeling better. No more pain or drainage. Her hearing is normal.     Past medical history, past surgical history, social history, medications, and allergies have been reviewed with the patient and are documented above.    Review of Systems: an ENT specific review of systems is normal    PHYSICAL EXAMINATION:  GEN: no acute distress, normocephalic  EARS: bilateral auricles normally formed. Bilateral ear canals are clear with no signs of infection or otorrhea. TMs are bilaterally intact and normal.  NECK: soft and supple. No lymphadenopathy or masses. Airway is midline.  PULM: breathing comfortably on room air, normal chest expansion with respiration    CT FACIAL BONES 5/10/22:   IMPRESSION:   1.  Asymmetric soft tissue swelling and enhancement involving the right ear with some extension into the right proximal external auditory canal. These findings are concerning for right-sided otitis externa with associated soft tissue cellulitis of the right ear.  2.  Asymmetric enhancement and slight enlargement of the right parotid gland, likely reflecting acute right-sided parotitis. **very subtle and more likely due to the inflammation of the ear canal directly adjacent as opposed to a secondary, independent issue  3.  No drainable fluid collection or abscess.  4.  The mastoid air cells are clear. No evidence of mastoiditis.    MEDICAL  DECISION-MAKING: Madeline is a 31yo F with a resolved AOE following appropriate oral and otic antibiotics recently at the hospital. No symptoms now and no signs of parotitis on exam or on her scan. RTC PRN.

## 2022-05-19 NOTE — LETTER
5/19/2022         RE: Madeline Graham  [address] [place] [address]        Dear Colleague,    Thank you for referring your patient, Madeline Garham, to the [facility]. Please see a copy of my visit note below.    HPI: This patient is a 33yo F who presents for follow-up for her ear at the request of Dr. Ayers. She has been seen a few times over the past 2 months with a draining and painful right ear. She was treated with cortisporin and oral antibiotics via urgent cares, etc. But the issue kept recurring. She presented again 2 weeks ago to the ER and was admitted for 2 days and placed on IV antibiotics. She was diagnosed with OE and parotitis, but by her account of her symptoms, it seems that she really just had a rough and undertreated AOE. Since discharge, and being in ciprodex and oral cipro, she is feeling better. No more pain or drainage. Her hearing is normal.     Past medical history, past surgical history, social history, medications, and allergies have been reviewed with the patient and are documented above.    Review of Systems: an ENT specific review of systems is normal    PHYSICAL EXAMINATION:  GEN: no acute distress, normocephalic  EARS: bilateral auricles normally formed. Bilateral ear canals are clear with no signs of infection or otorrhea. TMs are bilaterally intact and normal.  NECK: soft and supple. No lymphadenopathy or masses. Airway is midline.  PULM: breathing comfortably on room air, normal chest expansion with respiration    CT FACIAL BONES 5/10/22:   IMPRESSION:   1.  Asymmetric soft tissue swelling and enhancement involving the right ear with some extension into the right proximal external auditory canal. These findings are concerning for right-sided otitis externa with associated soft tissue cellulitis of the right ear.  2.  Asymmetric enhancement and slight enlargement of the right parotid gland, likely reflecting acute right-sided  parotitis. **very subtle and more likely due to the inflammation of the ear canal directly adjacent as opposed to a secondary, independent issue  3.  No drainable fluid collection or abscess.  4.  The mastoid air cells are clear. No evidence of mastoiditis.    MEDICAL DECISION-MAKING: Madeline is a 33yo F with a resolved AOE following appropriate oral and otic antibiotics recently at the hospital. No symptoms now and no signs of parotitis on exam or on her scan. RTC PRN.              Again, thank you for allowing me to participate in the care of your patient.        Sincerely,        Lisset Reynolds MD

## 2022-06-05 ENCOUNTER — HEALTH MAINTENANCE LETTER (OUTPATIENT)
Age: 32
End: 2022-06-05

## 2022-10-15 ENCOUNTER — HEALTH MAINTENANCE LETTER (OUTPATIENT)
Age: 32
End: 2022-10-15

## 2023-05-31 NOTE — DISCHARGE INSTRUCTIONS
It is recommended to follow up with your primary care provider in 7 days.      
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

## 2023-06-11 ENCOUNTER — HEALTH MAINTENANCE LETTER (OUTPATIENT)
Age: 33
End: 2023-06-11

## 2024-01-26 ENCOUNTER — TRANSFERRED RECORDS (OUTPATIENT)
Dept: HEALTH INFORMATION MANAGEMENT | Facility: CLINIC | Age: 34
End: 2024-01-26
Payer: MEDICAID

## 2024-03-15 NOTE — PROGRESS NOTES
HISTORY AND PHYSICAL UPDATE ADMISSION EXAM    Name: Madeline Graham  YOB: 1990  Medical Record Number: [id number]    History of Present Illness:     32yo  (CS x1) with right ovarian cyst presenting for laparoscopic right ovarian cystectomy, insertion of Mirena IUD    She reports regular monthly menses until approx 5 months ago when they have become extremely painful and heavy. 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 She states the flow used to be light and not last as long but now it is much heavier and lasts longer. Denies dyspareunia. Is not using contraception. Denies any right sided pain.           US: uterus unremarkable, endometrium unremarkable, R ovary 5.8 x 6.9 x 6.0 cm cyst containing hyperechoic lines throughout with areas of shadowing seen within cyst, no vascularity noted within cyst, possible dermoid vs other, hyperechoic tubular structure noted adjacent to R adnexal cyst approx 5 x 1.4 x 1.7 cm, L ovary unremarkable, no free fluid seen.       Past Medical History:   Diagnosis Date    Anxiety     Depression     Heart disease     Irregular heart beat     Migraine     Ovarian cyst      Past Surgical History:   Procedure Laterality Date     SECTION      GYN SURGERY               OB History    Para Term  AB Living   1 1 1 0 0 0   SAB IAB Ectopic Multiple Live Births   0 0 0 0 0      # Outcome Date GA Lbr Hteron/2nd Weight Sex Delivery Anes PTL Lv   1 Term                 Lab Results   Component Value Date    ABO A 2009    RH  Pos 2009    AS Neg 2009    HGB 13.6 2022       Exam:      /71   Pulse 59   Temp 98.2  F (36.8  C) (Oral)   Resp 18   Wt 85 kg (187 lb 4.8 oz)   LMP 2024   SpO2 98%   BMI 33.18 kg/m        HEENT grossly normal  Neck: no lymphadenopathy or  thryoidomegaly  Lungs CTA b/l  Heart RRR  ABD gravid, non-tender  EXT:  no edema, moves freely      Assessment: 32yo  (CS x1) with right ovarian cyst presenting for laparoscopic right ovarian cystectomy, insertion of Mirena IUD  Reviewed intraop plan with the patient. Discussed surgical risks including but not limited to pain, infection, bleeding, damage to surrounding structures including bladder/bowel/nerves. Discussed risk of oophorectomy as well. Informed consent obtained. No significant changes to med/surg history, preoperative clearance obtained.  Proceed to OR.     Serjio Randle MD   3/20/2024   7:18 AM

## 2024-03-19 ENCOUNTER — ANESTHESIA EVENT (OUTPATIENT)
Dept: SURGERY | Facility: HOSPITAL | Age: 34
End: 2024-03-19
Payer: MEDICAID

## 2024-03-20 ENCOUNTER — ANESTHESIA (OUTPATIENT)
Dept: SURGERY | Facility: HOSPITAL | Age: 34
End: 2024-03-20
Payer: MEDICAID

## 2024-03-20 ENCOUNTER — HOSPITAL ENCOUNTER (OUTPATIENT)
Facility: HOSPITAL | Age: 34
Discharge: HOME | End: 2024-03-20
Attending: STUDENT IN AN ORGANIZED HEALTH CARE EDUCATION/TRAINING PROGRAM | Admitting: STUDENT IN AN ORGANIZED HEALTH CARE EDUCATION/TRAINING PROGRAM
Payer: MEDICAID

## 2024-03-20 VITALS
WEIGHT: 187.3 LBS | SYSTOLIC BLOOD PRESSURE: 99 MMHG | BODY MASS INDEX: 33.18 KG/M2 | OXYGEN SATURATION: 99 % | TEMPERATURE: 97.8 F | RESPIRATION RATE: 20 BRPM | HEART RATE: 57 BPM | DIASTOLIC BLOOD PRESSURE: 53 MMHG

## 2024-03-20 DIAGNOSIS — N83.201 RIGHT OVARIAN CYST: Primary | ICD-10-CM

## 2024-03-20 LAB — HCG UR QL: NEGATIVE

## 2024-03-20 PROCEDURE — 81025 URINE PREGNANCY TEST: CPT | Performed by: STUDENT IN AN ORGANIZED HEALTH CARE EDUCATION/TRAINING PROGRAM

## 2024-03-20 PROCEDURE — 88112 CYTOPATH CELL ENHANCE TECH: CPT | Mod: TC | Performed by: STUDENT IN AN ORGANIZED HEALTH CARE EDUCATION/TRAINING PROGRAM

## 2024-03-20 PROCEDURE — 258N000003 HC RX IP 258 OP 636: Performed by: ANESTHESIOLOGY

## 2024-03-20 PROCEDURE — 250N000013 HC RX MED GY IP 250 OP 250 PS 637: Performed by: STUDENT IN AN ORGANIZED HEALTH CARE EDUCATION/TRAINING PROGRAM

## 2024-03-20 PROCEDURE — 250N000009 HC RX 250: Performed by: STUDENT IN AN ORGANIZED HEALTH CARE EDUCATION/TRAINING PROGRAM

## 2024-03-20 PROCEDURE — 272N000001 HC OR GENERAL SUPPLY STERILE: Performed by: STUDENT IN AN ORGANIZED HEALTH CARE EDUCATION/TRAINING PROGRAM

## 2024-03-20 PROCEDURE — 250N000009 HC RX 250: Performed by: ANESTHESIOLOGY

## 2024-03-20 PROCEDURE — 250N000011 HC RX IP 250 OP 636: Performed by: STUDENT IN AN ORGANIZED HEALTH CARE EDUCATION/TRAINING PROGRAM

## 2024-03-20 PROCEDURE — 88307 TISSUE EXAM BY PATHOLOGIST: CPT | Mod: TC | Performed by: STUDENT IN AN ORGANIZED HEALTH CARE EDUCATION/TRAINING PROGRAM

## 2024-03-20 PROCEDURE — 250N000011 HC RX IP 250 OP 636: Performed by: ANESTHESIOLOGY

## 2024-03-20 PROCEDURE — 710N000012 HC RECOVERY PHASE 2, PER MINUTE: Performed by: STUDENT IN AN ORGANIZED HEALTH CARE EDUCATION/TRAINING PROGRAM

## 2024-03-20 PROCEDURE — 258N000003 HC RX IP 258 OP 636: Performed by: STUDENT IN AN ORGANIZED HEALTH CARE EDUCATION/TRAINING PROGRAM

## 2024-03-20 PROCEDURE — 999N000141 HC STATISTIC PRE-PROCEDURE NURSING ASSESSMENT: Performed by: STUDENT IN AN ORGANIZED HEALTH CARE EDUCATION/TRAINING PROGRAM

## 2024-03-20 PROCEDURE — 360N000077 HC SURGERY LEVEL 4, PER MIN: Performed by: STUDENT IN AN ORGANIZED HEALTH CARE EDUCATION/TRAINING PROGRAM

## 2024-03-20 PROCEDURE — 710N000009 HC RECOVERY PHASE 1, LEVEL 1, PER MIN: Performed by: STUDENT IN AN ORGANIZED HEALTH CARE EDUCATION/TRAINING PROGRAM

## 2024-03-20 PROCEDURE — 250N000025 HC SEVOFLURANE, PER MIN: Performed by: STUDENT IN AN ORGANIZED HEALTH CARE EDUCATION/TRAINING PROGRAM

## 2024-03-20 PROCEDURE — 87081 CULTURE SCREEN ONLY: CPT | Performed by: STUDENT IN AN ORGANIZED HEALTH CARE EDUCATION/TRAINING PROGRAM

## 2024-03-20 PROCEDURE — 370N000017 HC ANESTHESIA TECHNICAL FEE, PER MIN: Performed by: STUDENT IN AN ORGANIZED HEALTH CARE EDUCATION/TRAINING PROGRAM

## 2024-03-20 DEVICE — IUD CONTRACEPTIVE DEVICE MIRENA 50419-4230-01: Type: IMPLANTABLE DEVICE | Site: UTERUS | Status: FUNCTIONAL

## 2024-03-20 RX ORDER — DEXAMETHASONE SODIUM PHOSPHATE 10 MG/ML
INJECTION, SOLUTION INTRAMUSCULAR; INTRAVENOUS PRN
Status: DISCONTINUED | OUTPATIENT
Start: 2024-03-20 | End: 2024-03-20

## 2024-03-20 RX ORDER — MAGNESIUM SULFATE 4 G/50ML
4 INJECTION INTRAVENOUS ONCE
Status: COMPLETED | OUTPATIENT
Start: 2024-03-20 | End: 2024-03-20

## 2024-03-20 RX ORDER — CEFAZOLIN SODIUM/WATER 2 G/20 ML
2 SYRINGE (ML) INTRAVENOUS
Status: DISCONTINUED | OUTPATIENT
Start: 2024-03-20 | End: 2024-03-20 | Stop reason: HOSPADM

## 2024-03-20 RX ORDER — OXYCODONE HYDROCHLORIDE 5 MG/1
5-10 TABLET ORAL EVERY 4 HOURS PRN
Qty: 3 TABLET | Refills: 0 | Status: SHIPPED | OUTPATIENT
Start: 2024-03-20

## 2024-03-20 RX ORDER — ACETAMINOPHEN 325 MG/1
975 TABLET ORAL ONCE
Status: DISCONTINUED | OUTPATIENT
Start: 2024-03-20 | End: 2024-03-20 | Stop reason: HOSPADM

## 2024-03-20 RX ORDER — NALOXONE HYDROCHLORIDE 1 MG/ML
0.1 INJECTION INTRAMUSCULAR; INTRAVENOUS; SUBCUTANEOUS
Status: DISCONTINUED | OUTPATIENT
Start: 2024-03-20 | End: 2024-03-20 | Stop reason: HOSPADM

## 2024-03-20 RX ORDER — SODIUM CHLORIDE, SODIUM LACTATE, POTASSIUM CHLORIDE, CALCIUM CHLORIDE 600; 310; 30; 20 MG/100ML; MG/100ML; MG/100ML; MG/100ML
INJECTION, SOLUTION INTRAVENOUS CONTINUOUS
Status: DISCONTINUED | OUTPATIENT
Start: 2024-03-20 | End: 2024-03-20 | Stop reason: HOSPADM

## 2024-03-20 RX ORDER — NALOXONE HYDROCHLORIDE 0.4 MG/ML
0.1 INJECTION, SOLUTION INTRAMUSCULAR; INTRAVENOUS; SUBCUTANEOUS
Status: DISCONTINUED | OUTPATIENT
Start: 2024-03-20 | End: 2024-03-20 | Stop reason: HOSPADM

## 2024-03-20 RX ORDER — SODIUM CHLORIDE, SODIUM LACTATE, POTASSIUM CHLORIDE, AND CALCIUM CHLORIDE .6; .31; .03; .02 G/100ML; G/100ML; G/100ML; G/100ML
IRRIGANT IRRIGATION PRN
Status: DISCONTINUED | OUTPATIENT
Start: 2024-03-20 | End: 2024-03-20 | Stop reason: HOSPADM

## 2024-03-20 RX ORDER — ONDANSETRON 2 MG/ML
4 INJECTION INTRAMUSCULAR; INTRAVENOUS EVERY 30 MIN PRN
Status: DISCONTINUED | OUTPATIENT
Start: 2024-03-20 | End: 2024-03-20 | Stop reason: HOSPADM

## 2024-03-20 RX ORDER — FENTANYL CITRATE 50 UG/ML
25 INJECTION, SOLUTION INTRAMUSCULAR; INTRAVENOUS EVERY 5 MIN PRN
Status: DISCONTINUED | OUTPATIENT
Start: 2024-03-20 | End: 2024-03-20 | Stop reason: HOSPADM

## 2024-03-20 RX ORDER — ONDANSETRON 4 MG/1
4 TABLET, ORALLY DISINTEGRATING ORAL EVERY 30 MIN PRN
Status: DISCONTINUED | OUTPATIENT
Start: 2024-03-20 | End: 2024-03-20 | Stop reason: HOSPADM

## 2024-03-20 RX ORDER — OXYCODONE HYDROCHLORIDE 5 MG/1
10 TABLET ORAL
Status: DISCONTINUED | OUTPATIENT
Start: 2024-03-20 | End: 2024-03-20 | Stop reason: HOSPADM

## 2024-03-20 RX ORDER — BUPIVACAINE HYDROCHLORIDE 2.5 MG/ML
INJECTION, SOLUTION INFILTRATION; PERINEURAL PRN
Status: DISCONTINUED | OUTPATIENT
Start: 2024-03-20 | End: 2024-03-20 | Stop reason: HOSPADM

## 2024-03-20 RX ORDER — OXYCODONE HYDROCHLORIDE 5 MG/1
5 TABLET ORAL
Status: DISCONTINUED | OUTPATIENT
Start: 2024-03-20 | End: 2024-03-20 | Stop reason: HOSPADM

## 2024-03-20 RX ORDER — FENTANYL CITRATE 50 UG/ML
25 INJECTION, SOLUTION INTRAMUSCULAR; INTRAVENOUS
Status: DISCONTINUED | OUTPATIENT
Start: 2024-03-20 | End: 2024-03-20 | Stop reason: HOSPADM

## 2024-03-20 RX ORDER — LIDOCAINE 40 MG/G
CREAM TOPICAL
Status: DISCONTINUED | OUTPATIENT
Start: 2024-03-20 | End: 2024-03-20 | Stop reason: HOSPADM

## 2024-03-20 RX ORDER — ONDANSETRON 2 MG/ML
INJECTION INTRAMUSCULAR; INTRAVENOUS PRN
Status: DISCONTINUED | OUTPATIENT
Start: 2024-03-20 | End: 2024-03-20

## 2024-03-20 RX ORDER — CEFAZOLIN SODIUM/WATER 2 G/20 ML
2 SYRINGE (ML) INTRAVENOUS SEE ADMIN INSTRUCTIONS
Status: DISCONTINUED | OUTPATIENT
Start: 2024-03-20 | End: 2024-03-20 | Stop reason: HOSPADM

## 2024-03-20 RX ORDER — FENTANYL CITRATE 50 UG/ML
INJECTION, SOLUTION INTRAMUSCULAR; INTRAVENOUS PRN
Status: DISCONTINUED | OUTPATIENT
Start: 2024-03-20 | End: 2024-03-20

## 2024-03-20 RX ORDER — ACETAMINOPHEN 325 MG/1
975 TABLET ORAL ONCE
Status: COMPLETED | OUTPATIENT
Start: 2024-03-20 | End: 2024-03-20

## 2024-03-20 RX ORDER — PROPOFOL 10 MG/ML
INJECTION, EMULSION INTRAVENOUS CONTINUOUS PRN
Status: DISCONTINUED | OUTPATIENT
Start: 2024-03-20 | End: 2024-03-20

## 2024-03-20 RX ORDER — IBUPROFEN 800 MG/1
800 TABLET, FILM COATED ORAL EVERY 6 HOURS PRN
Qty: 30 TABLET | Refills: 0 | Status: SHIPPED | OUTPATIENT
Start: 2024-03-20

## 2024-03-20 RX ORDER — IBUPROFEN 200 MG
800 TABLET ORAL ONCE
Status: DISCONTINUED | OUTPATIENT
Start: 2024-03-20 | End: 2024-03-20 | Stop reason: HOSPADM

## 2024-03-20 RX ORDER — PROPOFOL 10 MG/ML
INJECTION, EMULSION INTRAVENOUS PRN
Status: DISCONTINUED | OUTPATIENT
Start: 2024-03-20 | End: 2024-03-20

## 2024-03-20 RX ORDER — FENTANYL CITRATE 50 UG/ML
50 INJECTION, SOLUTION INTRAMUSCULAR; INTRAVENOUS EVERY 5 MIN PRN
Status: DISCONTINUED | OUTPATIENT
Start: 2024-03-20 | End: 2024-03-20 | Stop reason: HOSPADM

## 2024-03-20 RX ORDER — LIDOCAINE HYDROCHLORIDE 10 MG/ML
INJECTION, SOLUTION INFILTRATION; PERINEURAL PRN
Status: DISCONTINUED | OUTPATIENT
Start: 2024-03-20 | End: 2024-03-20

## 2024-03-20 RX ORDER — ACETAMINOPHEN 325 MG/1
975 TABLET ORAL EVERY 6 HOURS PRN
Qty: 50 TABLET | Refills: 0 | Status: SHIPPED | OUTPATIENT
Start: 2024-03-20

## 2024-03-20 RX ADMIN — ACETAMINOPHEN 975 MG: 325 TABLET ORAL at 06:55

## 2024-03-20 RX ADMIN — LIDOCAINE HYDROCHLORIDE 5 ML: 10 INJECTION, SOLUTION INFILTRATION; PERINEURAL at 07:34

## 2024-03-20 RX ADMIN — HYDROMORPHONE HYDROCHLORIDE 0.5 MG: 1 INJECTION, SOLUTION INTRAMUSCULAR; INTRAVENOUS; SUBCUTANEOUS at 08:55

## 2024-03-20 RX ADMIN — DEXMEDETOMIDINE HYDROCHLORIDE 8 MCG: 100 INJECTION, SOLUTION INTRAVENOUS at 07:28

## 2024-03-20 RX ADMIN — ONDANSETRON 4 MG: 2 INJECTION INTRAMUSCULAR; INTRAVENOUS at 07:40

## 2024-03-20 RX ADMIN — DEXAMETHASONE SODIUM PHOSPHATE 10 MG: 10 INJECTION, SOLUTION INTRAMUSCULAR; INTRAVENOUS at 07:34

## 2024-03-20 RX ADMIN — MIDAZOLAM 2 MG: 1 INJECTION INTRAMUSCULAR; INTRAVENOUS at 07:23

## 2024-03-20 RX ADMIN — Medication 2 G: at 07:23

## 2024-03-20 RX ADMIN — DEXMEDETOMIDINE HYDROCHLORIDE 8 MCG: 100 INJECTION, SOLUTION INTRAVENOUS at 07:40

## 2024-03-20 RX ADMIN — PROPOFOL 150 MG: 10 INJECTION, EMULSION INTRAVENOUS at 07:34

## 2024-03-20 RX ADMIN — ROCURONIUM BROMIDE 70 MG: 50 INJECTION, SOLUTION INTRAVENOUS at 07:34

## 2024-03-20 RX ADMIN — SODIUM CHLORIDE, POTASSIUM CHLORIDE, SODIUM LACTATE AND CALCIUM CHLORIDE: 600; 310; 30; 20 INJECTION, SOLUTION INTRAVENOUS at 06:45

## 2024-03-20 RX ADMIN — HYDROMORPHONE HYDROCHLORIDE 0.5 MG: 1 INJECTION, SOLUTION INTRAMUSCULAR; INTRAVENOUS; SUBCUTANEOUS at 08:38

## 2024-03-20 RX ADMIN — FENTANYL CITRATE 100 MCG: 50 INJECTION INTRAMUSCULAR; INTRAVENOUS at 07:29

## 2024-03-20 RX ADMIN — SUGAMMADEX 200 MG: 100 INJECTION, SOLUTION INTRAVENOUS at 08:37

## 2024-03-20 RX ADMIN — PROPOFOL 150 MCG/KG/MIN: 10 INJECTION, EMULSION INTRAVENOUS at 07:34

## 2024-03-20 RX ADMIN — DEXMEDETOMIDINE HYDROCHLORIDE 4 MCG: 100 INJECTION, SOLUTION INTRAVENOUS at 07:51

## 2024-03-20 RX ADMIN — MAGNESIUM SULFATE HEPTAHYDRATE 4 G: 80 INJECTION, SOLUTION INTRAVENOUS at 07:02

## 2024-03-20 ASSESSMENT — ACTIVITIES OF DAILY LIVING (ADL)
ADLS_ACUITY_SCORE: 29

## 2024-03-20 NOTE — ANESTHESIA CARE TRANSFER NOTE
Patient: Madeline Graham    Procedure: Procedure(s):  LAPAROSCOPIC RIGHT OVARIAN CYSTECTOMY, PELVIC WASHINGS  INSERTION OF INTRAUTERINE DEVICE       Diagnosis: Right ovarian cyst [N83.201]  Diagnosis Additional Information: No value filed.    Anesthesia Type:   General     Note:    Oropharynx: oropharynx clear of all foreign objects  Level of Consciousness: awake  Oxygen Supplementation: face mask  Level of Supplemental Oxygen (L/min / FiO2): 6  Independent Airway: airway patency satisfactory and stable  Dentition: dentition unchanged  Vital Signs Stable: post-procedure vital signs reviewed and stable  Report to RN Given: handoff report given  Patient transferred to: PACU    Handoff Report: Identifed the Patient, Identified the Reponsible Provider, Reviewed the pertinent medical history, Discussed the surgical course, Reviewed Intra-OP anesthesia mangement and issues during anesthesia, Set expectations for post-procedure period and Allowed opportunity for questions and acknowledgement of understanding      Vitals:  Vitals Value Taken Time   BP 99/54 03/20/24 0853   Temp 97    Pulse 61 03/20/24 0854   Resp 16 03/20/24 0854   SpO2 100 % 03/20/24 0854   Vitals shown include unfiled device data.    Electronically Signed By: MG Pichardo CRNA  March 20, 2024  8:55 AM

## 2024-03-20 NOTE — ANESTHESIA POSTPROCEDURE EVALUATION
Patient: Madeline Graham    Procedure: Procedure(s):  LAPAROSCOPIC RIGHT OVARIAN CYSTECTOMY, PELVIC WASHINGS  INSERTION OF INTRAUTERINE DEVICE       Anesthesia Type:  General    Note:  Disposition: Outpatient   Postop Pain Control: Uneventful            Sign Out: Well controlled pain   PONV: No   Neuro/Psych: Uneventful            Sign Out: Acceptable/Baseline neuro status   Airway/Respiratory: Uneventful            Sign Out: Acceptable/Baseline resp. status   CV/Hemodynamics: Uneventful            Sign Out: Acceptable CV status; No obvious hypovolemia; No obvious fluid overload   Other NRE: NONE   DID A NON-ROUTINE EVENT OCCUR?            Last vitals:  Vitals Value Taken Time   BP 98/56 03/20/24 0915   Temp 36.5  C (97.7  F) 03/20/24 0915   Pulse 55 03/20/24 0920   Resp 19 03/20/24 0920   SpO2 100 % 03/20/24 0920   Vitals shown include unfiled device data.    Electronically Signed By: Paradise Thomas MD  March 20, 2024  9:39 AM

## 2024-03-20 NOTE — OR NURSING
"Patient and her rideLisset in Phase II. Both state that they have not heard from Dr. PARKER regarding how surgery went and \"if she kept my ovary.\" Patient has some questions she would like answered. [place] RN called to Dr. Randle at [phone] with no response. RN sent out page via [phone] [facility] answering service at 1040.    Message left with [facility] answering service to have Dr. PARKER call patient's cell phone for update. Patient will be discharging home as she meets all criteria and does not want to wait for the call to leave.  "

## 2024-03-20 NOTE — ANESTHESIA PREPROCEDURE EVALUATION
Anesthesia Pre-Procedure Evaluation    Patient: Madeline Graham   MRN: [id number] : 1990        Procedure : Procedure(s):  LAPAROSCOPIC RIGHT OVARIAN CYSTECTOMY,  POSSIBLE RIGHT SALPINGO-OOPHORECTOMY,  INSERTION OF INTRAUTERINE DEVICE          Past Medical History:   Diagnosis Date    Anxiety     Depression     Heart disease     Irregular heart beat     Migraine     Ovarian cyst       Past Surgical History:   Procedure Laterality Date     SECTION      GYN SURGERY            No Known Allergies   Social History     Tobacco Use    Smoking status: Former     Packs/day: 1.00     Years: 5.00     Additional pack years: 0.00     Total pack years: 5.00     Types: Cigarettes    Smokeless tobacco: Not on file   Substance Use Topics    Alcohol use: Yes     Comment: ocassionally       Wt Readings from Last 1 Encounters:   24 85 kg (187 lb 4.8 oz)        Anesthesia Evaluation            ROS/MED HX  ENT/Pulmonary:  - neg pulmonary ROS     Neurologic:  - neg neurologic ROS     Cardiovascular:  - neg cardiovascular ROS   (+)  - -   -  - -                          Irregular Heartbeat/Palpitations (History of this but patient states it does not affect her anymore),            METS/Exercise Tolerance:     Hematologic:  - neg hematologic  ROS     Musculoskeletal:  - neg musculoskeletal ROS     GI/Hepatic:  - neg GI/hepatic ROS     Renal/Genitourinary:  - neg Renal ROS     Endo:  - neg endo ROS     Psychiatric/Substance Use:     (+) psychiatric history anxiety and depression       Infectious Disease:       Malignancy:       Other:            Physical Exam    Airway  airway exam normal      Mallampati: II   TM distance: > 3 FB   Neck ROM: full   Mouth opening: > 3 cm    Respiratory Devices and Support         Dental       (+) Minor Abnormalities - some fillings, tiny chips      Cardiovascular   cardiovascular exam normal          Pulmonary   pulmonary exam normal                OUTSIDE LABS:  CBC:   Lab  "Results   Component Value Date    WBC 10.0 05/12/2022    WBC 8.9 05/10/2022    HGB 13.6 05/12/2022    HGB 13.7 05/10/2022    HCT 41.8 05/12/2022    HCT 41.2 05/10/2022     05/12/2022     05/10/2022     BMP:   Lab Results   Component Value Date     05/12/2022     05/10/2022    POTASSIUM 4.2 05/12/2022    POTASSIUM 4.0 05/10/2022    CHLORIDE 108 (H) 05/12/2022    CHLORIDE 105 05/10/2022    CO2 22 05/12/2022    CO2 24 05/10/2022    BUN 11 05/12/2022    BUN 15 05/10/2022    CR 0.58 (L) 05/12/2022    CR 0.72 05/10/2022    GLC 87 05/12/2022     (H) 05/10/2022     COAGS: No results found for: \"PTT\", \"INR\", \"FIBR\"  POC:   Lab Results   Component Value Date    HCG Negative 05/10/2022     HEPATIC:   Lab Results   Component Value Date    ALBUMIN 3.4 09/01/2008    PROTTOTAL 7.0 09/01/2008    ALT 17 09/01/2008    AST 19 09/01/2008    ALKPHOS 47 09/01/2008    BILITOTAL 0.7 09/01/2008     OTHER:   Lab Results   Component Value Date    YEN 8.6 05/12/2022    LIPASE 22 03/27/2006    AMYLASE <30 (L) 03/27/2006       Anesthesia Plan    ASA Status:  2    NPO Status:  NPO Appropriate    Anesthesia Type: General.     - Airway: ETT   Induction: Intravenous.   Maintenance: TIVA.        Consents    Anesthesia Plan(s) and associated risks, benefits, and realistic alternatives discussed. Questions answered and patient/representative(s) expressed understanding.     - Discussed:     - Discussed with:  Patient      - Extended Intubation/Ventilatory Support Discussed: No.      - Patient is DNR/DNI Status: No          Postoperative Care    Pain management: Multi-modal analgesia.   PONV prophylaxis: Ondansetron (or other 5HT-3), Dexamethasone or Solumedrol     Comments:    Other Comments: GETA  1 PIV  Fentanyl prn, dilaudid prn  Decadron, zofran, propofol gtt           Paradise B Quast, MD    I have reviewed the pertinent notes and labs in the chart from the past 30 days and (re)examined the patient.  Any updates or " changes from those notes are reflected in this note.

## 2024-03-20 NOTE — OP NOTE
OPERATIVE REPORT     Name: Madeline Graham    MRN: [id number]    CSN: [id number]    Procedure date: 3/20/2024    PRE-OP DIAGNOSIS: right ovarian cyst, abnormal uterine bleeding    POST-OPERATIVE DIAGNOSIS: same    OPERATION: laparoscopic right ovarian cystectomy, pelvic washings, insertion of Mirena IUD     ATTENDING SURGEON: Serjio Randle MD    ASSISTANT(S): Circulator: West Martin RN  Relief Scrub: Juani Crawford  Scrub Person: Shayy Norwood  Staff Assist: Maria R Amaral    ANESTHESIA: General ET    INTRAVENOUS FLUIDS: see anesthesia record     URINE OUTPUT: 500 ML.    ESTIMATED BLOOD LOSS: 10 ML.    SPECIMENS: right ovarian cyst, pelvic washings     FINDINGS:   LSC - normal uterus, right ovary with 6cm cyst containing hair and sebum consistent with dermoid, normal left adnexa, no adhesive disease or endometriosis, normal appendix and intraabdominal anatomy.    DESCRIPTION OF PROCEDURE:   The patient was informed of the risks, benefits, and alternatives of the above-mentioned procedures and the consent was signed and witnessed. The patient was taken to the operating room and placed in the supine position. General endotracheal anesthesia was achieved without difficulty. The patient was given preoperative prophylactic intravenous antibiotics. The patient was then placed in the dorsal lithotomy position using Devin stirrups. All pressure points were avoided or padded appropriately and a Kalia Hugger  was placed. Both arms were tucked in anatomical position at the patient s side. The patient was then examined, prepped and draped in the usual sterile fashion. Exam under anesthesia as noted above. A maria catheter was introduced into the bladder. A speculum was placed into the vagina and the anterior lip of the cervix grasped with a single tooth tenaculum. The uterus was sounded to 7 cm. A humi uterine manipulator was introduced into the uterus. The tenaculum was removed and the puncture sites  hemostatic. The speculum was removed from the vagina.    On the abdomen, the Veress needle was carefully introduced into the peritoneal cavity via a small incision at the umbilicus while tenting the abdominal wall. Placement was confirmed with a drop in intra-abdominal pressure with insufflation of CO2 gas. Pneumoperitoneum was insufflated without difficulty. The umbilical port was placed using an 5cm optical trocar. The laparoscope was inserted, and the organs immediately below the site of entry was inspected and intact. The patient was placed into trendelenburg and the remaining ports, including one 5mm left lower quadrant port, one 5mm right sided port, and one 10mm right sided port, under direct laparoscopic guidance. The pelvis and upper abdomen were explored with findings as noted above.    We obtained pelvic washings. I next used monopolar scissors to incise the ovarian cortex at the antimesenteric side. Using traction and counter traction, I  the cyst wall from the ovarian tissue. 30 minutes were spent performing the cystectomy. Next an endocatch bag was introduced into the abdomen and the cyst was removed. We then irrigated, and made the ovarian bed hemostatic with monopolar and sugicel powder. Hemostasis was achieved.     The fascia of the 10mm port was closed using 0 vicryl and the fascial closure device. The skin was closed with 4-0 Monocryl and 20 cc of 0.5% Marcaine was infiltrated. Adhesive bandages were placed over skin incisions. The patient was returned to the supine position.     The humi manipulator was removed from the uterus. A speculum was placed and the cervix was grasped with a tenaculum. The Mirena was inserted to the fundus using manufacturor's applicator (Lot [id number], Exp 1/2026) and strings were cut to 2cm. The tenaculum was removed and puncture sites made hemostatic with silver nitrite.     All I&O were noted, lap and instrument counts were correct times two at the completion  of the procedure. The patient was then awakened from her anesthetic, extubated without difficulty and taken to the Post Anesthesia Care Unit in stable condition. Procedure overall uncomplicated.     Serjio Randle MD

## 2024-03-20 NOTE — OR NURSING
Discharge instructions reviewed via phone with patient's Aunt, Lisset at [phone]. Lisset verbalizes understanding and states she has no questions at this time. RN told her that everything reviewed would be in the AVS discharge packet along with her paper prescriptions (3).

## 2024-03-20 NOTE — ANESTHESIA PROCEDURE NOTES
Airway       Patient location during procedure: OR       Procedure Start/Stop Times: 3/20/2024 7:36 AM  Staff -        Performed By: CRNA  Consent for Airway        Urgency: elective  Indications and Patient Condition       Indications for airway management: virgilio-procedural       Induction type:intravenous       Mask difficulty assessment: 1 - vent by mask    Final Airway Details       Final airway type: endotracheal airway       Successful airway: ETT - single and Oral  Endotracheal Airway Details        ETT size (mm): 7.0       Cuffed: yes       Successful intubation technique: direct laryngoscopy       DL Blade Type: MAC 3       Grade View of Cords: 1       Adjucts: stylet       Position: Right       Measured from: gums/teeth       Secured at (cm): 21       Bite block used: Soft    Post intubation assessment        Placement verified by: capnometry, equal breath sounds and chest rise        Number of attempts at approach: 1       Number of other approaches attempted: 0       Secured with: tape       Ease of procedure: easy       Dentition: Intact and Unchanged    Medication(s) Administered   Medication Administration Time: 3/20/2024 7:36 AM

## 2024-03-21 LAB
PATH REPORT.COMMENTS IMP SPEC: NORMAL
PATH REPORT.FINAL DX SPEC: NORMAL
PATH REPORT.FINAL DX SPEC: NORMAL
PATH REPORT.GROSS SPEC: NORMAL
PATH REPORT.GROSS SPEC: NORMAL
PATH REPORT.MICROSCOPIC SPEC OTHER STN: NORMAL
PATH REPORT.MICROSCOPIC SPEC OTHER STN: NORMAL
PATH REPORT.RELEVANT HX SPEC: NORMAL
PHOTO IMAGE: NORMAL

## 2024-03-21 PROCEDURE — 88112 CYTOPATH CELL ENHANCE TECH: CPT | Mod: 26 | Performed by: PATHOLOGY

## 2024-03-21 PROCEDURE — 88307 TISSUE EXAM BY PATHOLOGIST: CPT | Mod: 26 | Performed by: PATHOLOGY

## 2024-03-22 LAB — BACTERIA SPEC CULT: NORMAL

## 2024-08-04 ENCOUNTER — HEALTH MAINTENANCE LETTER (OUTPATIENT)
Age: 34
End: 2024-08-04

## 2025-08-16 ENCOUNTER — HEALTH MAINTENANCE LETTER (OUTPATIENT)
Age: 35
End: 2025-08-16

## (undated) DEVICE — ADH LIQUID MASTISOL TOPICAL VIAL 2-3ML 0523-48

## (undated) DEVICE — UTERINE MANIPULATOR HUMI KRONER 6003

## (undated) DEVICE — SOL WATER IRRIG 1000ML BOTTLE 2F7114

## (undated) DEVICE — Device

## (undated) DEVICE — TROCAR ADV FIXATION NONBLADED 5X100MM

## (undated) DEVICE — CATH FOLEY 16FR 5ML LUBRICATH LATEX 0165L16

## (undated) DEVICE — ANTIFOG SOLUTION SEE SHARP 150M TROCAR SWABS 30978

## (undated) DEVICE — PREP DYNA-HEX 4% CHG SCRUB 4OZ BOTTLE MDS098710

## (undated) DEVICE — DRSG TELFA 3X4" 1050

## (undated) DEVICE — PROTECTOR ARM STANDARD ONE STEP

## (undated) DEVICE — BRIEF STRETCH XL MPS40

## (undated) DEVICE — CUSTOM PACK PELVISCOPY SMA5BPVHEA

## (undated) DEVICE — PREP CHLORAPREP 26ML TINTED HI-LITE ORANGE 930815

## (undated) DEVICE — ENDO SHEARS RENEW LAP ENDOCUT SCISSOR TIP 16.5MM 3142

## (undated) DEVICE — SYRINGE 10ML FILL SALINE FLUSH STERILE 306553

## (undated) DEVICE — GOWN LG DISP 9515

## (undated) DEVICE — GLOVE UNDER INDICATOR PI SZ 6 LF 41660

## (undated) DEVICE — SUCTION MANIFOLD NEPTUNE 2 SYS 1 PORT 702-025-000

## (undated) DEVICE — SUTURE PASSOR W/GUIDE RSG-14F-4-WG

## (undated) DEVICE — TUBING SMOKE EVAC PNEUMOCLEAR HIGH FLOW 0620050250

## (undated) DEVICE — ENDO POUCH UNIV RETRIEVAL SYSTEM INZII 10MM CD001

## (undated) DEVICE — MAT FLOOR SURGICAL 40X38 0702140238

## (undated) DEVICE — ESU GROUND PAD ADULT REM W/15' CORD E7507DB

## (undated) DEVICE — GLOVE BIOGEL PI ULTRATOUCH G SZ 6.0 42160

## (undated) DEVICE — SU VICRYL+ 0 27 UR6 VLT VCP603H

## (undated) DEVICE — SUCTION STRYKERFLOW II 250-070-500

## (undated) DEVICE — ENDO TROCAR FIRST ENTRY KII FIOS ADV FIX 05X100MM CFF03

## (undated) DEVICE — SURGICEL POWDER ABSORBABLE HEMOSTAT 3GM 3013SP

## (undated) DEVICE — PAD POS XL 1X20X40IN PINK PIGAZZI

## (undated) DEVICE — ENDO TROCAR SLEEVE KII ADV FIXATION 05X100MM CFS02

## (undated) DEVICE — ESU LIGASURE LAPAROSCOPIC BLUNT TIP SEALER 5MMX37CM LF1837

## (undated) DEVICE — SUTURE MONOCRYL+ 4-0 PS-2 27IN MCP426H

## (undated) DEVICE — SURGICEL ENDOSCOPIC APPLICATOR FOR ORC POWDER 3123SPEA

## (undated) RX ORDER — FENTANYL CITRATE 50 UG/ML
INJECTION, SOLUTION INTRAMUSCULAR; INTRAVENOUS
Status: DISPENSED
Start: 2024-03-20

## (undated) RX ORDER — LIDOCAINE HYDROCHLORIDE 10 MG/ML
INJECTION, SOLUTION EPIDURAL; INFILTRATION; INTRACAUDAL; PERINEURAL
Status: DISPENSED
Start: 2024-03-20

## (undated) RX ORDER — DEXAMETHASONE SODIUM PHOSPHATE 10 MG/ML
INJECTION, SOLUTION INTRAMUSCULAR; INTRAVENOUS
Status: DISPENSED
Start: 2024-03-20

## (undated) RX ORDER — ONDANSETRON 2 MG/ML
INJECTION INTRAMUSCULAR; INTRAVENOUS
Status: DISPENSED
Start: 2024-03-20

## (undated) RX ORDER — BUPIVACAINE HYDROCHLORIDE 2.5 MG/ML
INJECTION, SOLUTION EPIDURAL; INFILTRATION; INTRACAUDAL
Status: DISPENSED
Start: 2024-03-20

## (undated) RX ORDER — VASOPRESSIN 20 U/ML
INJECTION PARENTERAL
Status: DISPENSED
Start: 2024-03-20

## (undated) RX ORDER — PROPOFOL 10 MG/ML
INJECTION, EMULSION INTRAVENOUS
Status: DISPENSED
Start: 2024-03-20